# Patient Record
Sex: FEMALE | Race: WHITE | Employment: OTHER | URBAN - METROPOLITAN AREA
[De-identification: names, ages, dates, MRNs, and addresses within clinical notes are randomized per-mention and may not be internally consistent; named-entity substitution may affect disease eponyms.]

---

## 2017-01-04 DIAGNOSIS — E87.6 HYPOKALEMIA: Primary | ICD-10-CM

## 2017-01-04 RX ORDER — POTASSIUM CHLORIDE 1500 MG/1
TABLET, EXTENDED RELEASE ORAL
Qty: 120 TABLET | Refills: 7 | Status: SHIPPED | OUTPATIENT
Start: 2017-01-04 | End: 2017-08-29

## 2017-01-04 NOTE — TELEPHONE ENCOUNTER
Potassium 20meq tabs      Last Written Prescription Date: 09/10/16  Last Fill Quantity: 120, # refills: 3  Last Office Visit with [facility], [facility] or [facility] prescribing provider: 12/07/16   Next 5 appointments (look out 90 days)     Mar 08, 2017  9:00 AM   SHORT with Mari Dunaway MD   [facility] ([facility])    [address] [place] [id number] [phone]                   POTASSIUM   Date Value Ref Range Status   09/14/2016 4.9 3.4 - 5.3 mmol/L Final     CREATININE   Date Value Ref Range Status   09/16/2016 0.78 0.52 - 1.04 mg/dL Final     BP Readings from Last 3 Encounters:   12/15/16 138/72   12/07/16 112/68   10/10/16 120/70

## 2017-01-17 DIAGNOSIS — I10 ESSENTIAL HYPERTENSION, BENIGN: Primary | ICD-10-CM

## 2017-01-18 RX ORDER — CARVEDILOL 25 MG/1
25 TABLET ORAL 2 TIMES DAILY WITH MEALS
Qty: 180 TABLET | Refills: 2 | Status: SHIPPED | OUTPATIENT
Start: 2017-01-18 | End: 2017-09-25

## 2017-02-24 DIAGNOSIS — M79.603 PAIN OF UPPER EXTREMITY, UNSPECIFIED LATERALITY: ICD-10-CM

## 2017-02-24 DIAGNOSIS — G89.4 CHRONIC PAIN SYNDROME: ICD-10-CM

## 2017-02-24 NOTE — TELEPHONE ENCOUNTER
RX monitoring program ([place]MP) reviewed:  reviewed- no concerns    [place]MP profile:  [email or web address]

## 2017-02-24 NOTE — TELEPHONE ENCOUNTER
Controlled Substance Refill Request for Tramadol 50 mg tab   Problem List Complete:  No     PROVIDER TO CONSIDER COMPLETION OF PROBLEM LIST AND OVERVIEW/CONTROLLED SUBSTANCE AGREEMENT    Last Written Prescription Date:  12/21/16  Last Fill Quantity: 30,   # refills: 0    Last Office Visit with [facility] primary care provider: 12/07/16 Dr. Dunaway    Future Office visit:   Next 5 appointments (look out 90 days)     Mar 08, 2017  9:00 AM CST   SHORT with Mari Dunaway MD   [facility] ([facility]    [address] [place] [address] [phone]                  Controlled substance agreement on file: No.     Processing:  [facility]   checked in past 6 months?  No, route to RN

## 2017-02-27 RX ORDER — TRAMADOL HYDROCHLORIDE 50 MG/1
50 TABLET ORAL EVERY 6 HOURS PRN
Qty: 30 TABLET | Refills: 0 | Status: SHIPPED | OUTPATIENT
Start: 2017-02-27 | End: 2017-03-15

## 2017-02-27 NOTE — TELEPHONE ENCOUNTER
Faxed Tramadol Rx to [facility] [address] [place] [address]. [phone]. Ruth Behrens.

## 2017-03-07 ENCOUNTER — CARE COORDINATION (OUTPATIENT)
Dept: GERIATRIC MEDICINE | Facility: CLINIC | Age: 82
End: 2017-03-07

## 2017-03-07 NOTE — PROGRESS NOTES
Rec'd vm from Ana at [facility], request a call back to review current auth/rate that is in the system. [phone]  3/7/17 Call placed to Ana, current auth is 24 units per day, 3 days/wk.  Family is transporting client. Ana states that they have been denied by [employer] and inquired on unit rate vs daily rate. Shared that the Health Plans follow DHS guidelines and we are no longer able to utilize unit rate, only daily which is 6 hrs/day.  Enc'd Ana to f/u with [employer] on why the denial, if CM can assist to call back.  Anna Ely RN, BC  Supervisor [facility]   [phone] (Fax)

## 2017-03-10 NOTE — PROGRESS NOTES
3/9/17 Rec'd vm from Nikia, client's niece requesting a return call. Nikia states that Nataliia can only attend 6 hrs/day at [facility] and also inquired on [employer]   Call placed to Nikia to share that an auth for [facility] can be 6 hrs/day.  CM provided contact number to [employer] to f/u on application. Nikia states that she went back to work and the 6 hrs/day may be an issue as they are currently transporting her.   3/9/17 Noted that CM made error in [facility] auth-auth can be completed in units. $3.37/unit. Spoke with Ana, client attending 7.5 hrs per day 3 days/wk  (30 units per day)  Spoke with Ana, CM will make adjustment in auth.  3/9/17 Left vm with Nikia requesting a return call to report that Nataliia can cont to attend 7.5 hrs/day.  Anna Ely RN, BC  Supervisor [facility]   [phone] (Fax)

## 2017-03-15 ENCOUNTER — OFFICE VISIT (OUTPATIENT)
Dept: FAMILY MEDICINE | Facility: CLINIC | Age: 82
End: 2017-03-15
Payer: COMMERCIAL

## 2017-03-15 VITALS
OXYGEN SATURATION: 98 % | HEART RATE: 82 BPM | HEIGHT: 60 IN | SYSTOLIC BLOOD PRESSURE: 118 MMHG | BODY MASS INDEX: 37.03 KG/M2 | RESPIRATION RATE: 20 BRPM | DIASTOLIC BLOOD PRESSURE: 70 MMHG | TEMPERATURE: 98 F | WEIGHT: 188.6 LBS

## 2017-03-15 DIAGNOSIS — I10 HTN, GOAL BELOW 140/90: Primary | ICD-10-CM

## 2017-03-15 DIAGNOSIS — G89.4 CHRONIC PAIN SYNDROME: ICD-10-CM

## 2017-03-15 DIAGNOSIS — E78.5 HYPERLIPIDEMIA LDL GOAL <100: ICD-10-CM

## 2017-03-15 DIAGNOSIS — I48.20 CHRONIC ATRIAL FIBRILLATION (H): ICD-10-CM

## 2017-03-15 DIAGNOSIS — J44.9 CHRONIC OBSTRUCTIVE PULMONARY DISEASE, UNSPECIFIED COPD TYPE (H): ICD-10-CM

## 2017-03-15 DIAGNOSIS — M79.603 PAIN OF UPPER EXTREMITY, UNSPECIFIED LATERALITY: ICD-10-CM

## 2017-03-15 DIAGNOSIS — E11.59 TYPE 2 DIABETES MELLITUS WITH OTHER CIRCULATORY COMPLICATIONS (H): ICD-10-CM

## 2017-03-15 LAB — HBA1C MFR BLD: 5.8 % (ref 4.3–6)

## 2017-03-15 PROCEDURE — 99000 SPECIMEN HANDLING OFFICE-LAB: CPT | Performed by: FAMILY MEDICINE

## 2017-03-15 PROCEDURE — 83036 HEMOGLOBIN GLYCOSYLATED A1C: CPT | Performed by: FAMILY MEDICINE

## 2017-03-15 PROCEDURE — 80048 BASIC METABOLIC PNL TOTAL CA: CPT | Performed by: FAMILY MEDICINE

## 2017-03-15 PROCEDURE — 80307 DRUG TEST PRSMV CHEM ANLYZR: CPT | Mod: 90 | Performed by: FAMILY MEDICINE

## 2017-03-15 PROCEDURE — 99214 OFFICE O/P EST MOD 30 MIN: CPT | Performed by: FAMILY MEDICINE

## 2017-03-15 PROCEDURE — 36415 COLL VENOUS BLD VENIPUNCTURE: CPT | Performed by: FAMILY MEDICINE

## 2017-03-15 RX ORDER — TRAMADOL HYDROCHLORIDE 50 MG/1
50 TABLET ORAL EVERY 6 HOURS PRN
Qty: 60 TABLET | Refills: 0
Start: 2017-03-15 | End: 2017-04-04

## 2017-03-15 NOTE — NURSING NOTE
Chief Complaint   Patient presents with     Diabetes     follow-up       Initial /70 (BP Location: Right arm, Patient Position: Chair, Cuff Size: Adult Large)  Pulse 82  Temp 98  F (36.7  C) (Oral)  Resp 20  Ht 5' (1.524 m)  Wt 188 lb 9.6 oz (85.5 kg)  SpO2 98%  BMI 36.83 kg/m2 Estimated body mass index is 36.83 kg/(m^2) as calculated from the following:    Height as of this encounter: 5' (1.524 m).    Weight as of this encounter: 188 lb 9.6 oz (85.5 kg).  Medication Reconciliation: complete     Ruchi Aly, CMA

## 2017-03-15 NOTE — PROGRESS NOTES
SUBJECTIVE:                                                    Nataliia Scanlon is a 81 year old female who presents to clinic today for the following health issues:        Diabetes Follow-up      Patient is checking blood sugars: not at all    Diabetic concerns: None     Symptoms of hypoglycemia (low blood sugar): none     Paresthesias (numbness or burning in feet) or sores: No     Date of last diabetic eye exam: June 2016     Hyperlipidemia Follow-Up      Rate your low fat/cholesterol diet?: good    Taking statin?  Yes, no muscle aches from statin    Other lipid medications/supplements?:  Fish oil/Omega 3, dose  without side effects     Hypertension Follow-up      Outpatient blood pressures are being checked at home.  Results are WNL.    Low Salt Diet: no added salt         Amount of exercise or physical activity: 2 days per week    Problems taking medications regularly: No    Medication side effects: none  Diet: regular (no restrictions)    Past Medical History   Diagnosis Date     Aortic sclerosis (H) 2006     check echo every 2 years - last one 11/2013     Atrial fibrillation (H) 2014     Dr. Lamar and she decided NO coumadin 1/2015     Baker's cyst of knee 4/2008     left - small     CHF (congestive heart failure) 12/3/2014     Chronic airway obstruction, not elsewhere classified 2002     moderately severe     Diabetes type 2, controlled (H) 2011     HTN, goal below 140/90 5/21/2003     Hyperlipidaemia      Maxillary fracture (H) 5-14     Mitral regurgitation      1-2+     NONSPECIFIC MEDICAL HISTORY 2006     pt is DNI but not DNR - see living will      Osteoarthritis 4/2009     and bilateral knee steroid injection     Syncope      Tricuspid regurgitation      2-3+     Unspecified essential hypertension      Venous insufficiency 5/19/2010       Past Surgical History   Procedure Laterality Date     C nonspecific procedure       hosp x 1 for HTN       MEDICATIONS:  Current Outpatient Prescriptions   Medication      traMADol (ULTRAM) 50 MG tablet     carvedilol (COREG) 25 MG tablet     potassium chloride SA (K-DUR/KLOR-CON M) 20 MEQ CR tablet     spironolactone (ALDACTONE) 25 MG tablet     fluticasone (FLONASE) 50 MCG/ACT spray     aspirin 81 MG tablet     donepezil (ARICEPT) 5 MG tablet     losartan (COZAAR) 100 MG tablet     fish oil-omega-3 fatty acids 1000 MG capsule     Magnesium 400 MG CAPS     order for DME     order for DME     hydrALAZINE (APRESOLINE) 25 MG tablet     atorvastatin (LIPITOR) 20 MG tablet     fluticasone-salmeterol (ADVAIR DISKUS) 250-50 MCG/DOSE diskus inhaler     furosemide (LASIX) 20 MG tablet     calcium carbonate-vitamin D 500-400 MG-UNIT TABS tablt     Elastic Bandages & Supports (JOBST RELIEF KNEE HIGH/MEDIUM) MISC     albuterol (2.5 MG/3ML) 0.083% nebulizer solution     albuterol (PROAIR HFA, PROVENTIL HFA, VENTOLIN HFA) 108 (90 BASE) MCG/ACT inhaler     order for DME     ascorbic acid (VITAMIN C) 500 MG tablet     ipratropium - albuterol 0.5 mg/2.5 mg/3 mL (DUONEB) 0.5-2.5 (3) MG/3ML nebulization     No current facility-administered medications for this visit.        SOCIAL HISTORY:  Social History   Substance Use Topics     Smoking status: Never Smoker     Smokeless tobacco: Never Used     Alcohol use No       Family History   Problem Relation Age of Onset     Hypertension Mother      Hypertension Sister        Objective:  Blood pressure 118/70, pulse 82, temperature 98  F (36.7  C), temperature source Oral, resp. rate 20, height 5' (1.524 m), weight 188 lb 9.6 oz (85.5 kg), SpO2 98 %, not currently breastfeeding.  Neck:  There is no lymphadenopathy or thyroid tenderness or enlargement  Chest: Clear to auscultation bilaterally.  No wheezes, rales or retractions.  CV:  irreg irreg with NORMAL rate and with 2-3 /6 murmur - which is stable  Extremities: There is no clubbing, cyanosis - some puffy NON pitting 1+ ankle edema.   Peripheral pulses are present bilaterally in the radial and dorsalis  pedis arteries.    Assessment:  1. hypertension - under good control  2. Diabetes - under good control  3. Lipids - have been good  4. Aortic sclerosis - stable  5. COPD -stable -very good this last season  6. Chronic a fib - stable and no CHF in last year  7. Chronic pain - will start giving 60 ultram at once which will last year 1-2 months per her request      Plan:  1. Continue meds at current doses  2. Does not need any refills today  3. See [facility] orders for labs  4. Recheck in 6 months  5. Will give 60 tramadol each refill now and will not need every 30 days anymore but more like every 45 days.

## 2017-03-15 NOTE — PATIENT INSTRUCTIONS
You may schedule your mammogram at [facility] by calling [phone] and asking to schedule your mammogram or you may schedule with [facility] [place] in [place] by calling [phone].

## 2017-03-15 NOTE — LETTER
[facility]  [address]  [place], [place], [id number] [phone]      March 20, 2017    Nataliia Scanlon  [address]   [facility] [address]          Dear Nataliia,    The results of your recent tests were normal.  Enclosed is a copy of the results.  It was a pleasure to see you at your last appointment.  Results for orders placed or performed in visit on 03/15/17   Hemoglobin A1c   Result Value Ref Range    Hemoglobin A1C 5.8 4.3 - 6.0 %   Basic metabolic panel   Result Value Ref Range    Sodium 138 133 - 144 mmol/L    Potassium 5.0 3.4 - 5.3 mmol/L    Chloride 100 94 - 109 mmol/L    Carbon Dioxide 33 (H) 20 - 32 mmol/L    Anion Gap 5 3 - 14 mmol/L    Glucose 91 70 - 99 mg/dL    Urea Nitrogen 18 7 - 30 mg/dL    Creatinine 0.78 0.52 - 1.04 mg/dL    GFR Estimate 71 >60 mL/min/1.7m2    GFR Estimate If Black 86 >60 mL/min/1.7m2    Calcium 8.6 8.5 - 10.1 mg/dL   Drug Screen Comprehensive , Urine with Reported Meds (Pain Care Package)   Result Value Ref Range    Pain Drug SCR UR W RPTD Meds       FINAL  (Note)  ====================================================================  TOXASSURE COMP DRUG ANALYSIS,UR  ====================================================================  Test                             Result       Flag       Units  Drug Present and Declared for Prescription Verification   Tramadol                       PRESENT      EXPECTED   O-Desmethyltramadol            PRESENT      EXPECTED   N-Desmethyltramadol            PRESENT      EXPECTED    Source of tramadol is a prescription medication.    O-desmethyltramadol and N-desmethyltramadol are expected    metabolites of tramadol.    ====================================================================  Test                      Result    Flag   Units      Ref Range   Creatinine              56               mg/dL      >=20  ====================================================================  Declared Medications:  The flagging  and interpretation on this report are based on the  following declared medications.   Unexpected results may arise from  inaccuracies in the declared medications.    **Note: The testing scope of this panel includes these medications:    Tramadol  Tramadol (Ultram)  ====================================================================  For clinical consultation, please call [phone].  ====================================================================  Analysis performed by [facility] [address], [place] [address]         If you have any questions or concerns, please call myself or my nurse at [phone].          Sincerely,    Mari Jay MD  [id number]

## 2017-03-15 NOTE — MR AVS SNAPSHOT
"              After Visit Summary   3/15/2017    Nataliia Scanlon    MRN: [id number]           Patient Information     Date Of Birth          1935        Visit Information        Provider Department      3/15/2017 9:15 AM Mari Dunaway MD [facility]        Today's Diagnoses     HTN, goal below 140/90    -  1    Chronic obstructive pulmonary disease, unspecified COPD type (H)        Hyperlipidemia LDL goal <100        Aortic sclerosis (H)        Type 2 diabetes mellitus with other circulatory complications (H)        Chronic atrial fibrillation (H)        Chronic pain syndrome        Pain of upper extremity, unspecified laterality           Follow-ups after your visit        Your next 10 appointments already scheduled     Jun 12, 2017  9:30 AM CDT   SHORT with Timoteo Ramos RPH   [facility] ([facility])    [address] [facility] [address] [phone]              Who to contact     If you have questions or need follow up information about today's clinic visit or your schedule please contact [facility] directly at [phone].  Normal or non-critical lab and imaging results will be communicated to you by Bawtehart, letter or phone within 4 business days after the clinic has received the results. If you do not hear from us within 7 days, please contact the clinic through Bawtehart or phone. If you have a critical or abnormal lab result, we will notify you by phone as soon as possible.  Submit refill requests through Trunity or call your pharmacy and they will forward the refill request to us. Please allow 3 business days for your refill to be completed.          Additional Information About Your Visit        MyChart Information     Trunity lets you send messages to your doctor, view your test results, renew your prescriptions, schedule appointments and more. To sign up, go to [email or web address] . Click on \"Log in\" " "on the left side of the screen, which will take you to the Welcome page. Then click on \"Sign up Now\" on the right side of the page.     You will be asked to enter the access code listed below, as well as some personal information. Please follow the directions to create your username and password.     Your access code is: [email or web address]  Expires: 2017  9:44 AM     Your access code will  in 90 days. If you need help or a new code, please call your [facility] or [phone].        Care EveryWhere ID     This is your Care EveryWhere ID. This could be used by other organizations to access your [place] medical records  [id number]        Your Vitals Were     Pulse Temperature Respirations Height Pulse Oximetry BMI (Body Mass Index)    82 98  F (36.7  C) (Oral) 20 5' (1.524 m) 98% 36.83 kg/m2       Blood Pressure from Last 3 Encounters:   03/15/17 118/70   12/15/16 138/72   16 112/68    Weight from Last 3 Encounters:   03/15/17 188 lb 9.6 oz (85.5 kg)   12/15/16 190 lb 12.8 oz (86.5 kg)   16 188 lb 14.4 oz (85.7 kg)              We Performed the Following     Basic metabolic panel     Drug Screen Comprehensive , Urine with Reported Meds (Pain Care Package)     Hemoglobin A1c          Today's Medication Changes          These changes are accurate as of: 3/15/17  9:47 AM.  If you have any questions, ask your nurse or doctor.               These medicines have changed or have updated prescriptions.        Dose/Directions    calcium carbonate-vitamin D 500-400 MG-UNIT Tabs per tablet   This may have changed:  when to take this   Used for:  Osteopenia        Dose:  1 tablet   Take 1 tablet by mouth 3 times daily   Quantity:  270 tablet   Refills:  3       fluticasone-salmeterol 250-50 MCG/DOSE diskus inhaler   Commonly known as:  ADVAIR DISKUS   This may have changed:  additional instructions   Used for:  Chronic obstructive pulmonary disease, unspecified COPD type (H)        Dose:  1 puff   Inhale " 1 puff into the lungs 2 times daily   Quantity:  1 Inhaler   Refills:  3            Where to get your medicines      Some of these will need a paper prescription and others can be bought over the counter.  Ask your nurse if you have questions.     You don't need a prescription for these medications     traMADol 50 MG tablet                Primary Care Provider Office Phone # Fax #    Mari Dunaway [phone]       [facility] [address] [facility] [address]        Thank you!     Thank you for choosing [facility]  for your care. Our goal is always to provide you with excellent care. Hearing back from our patients is one way we can continue to improve our services. Please take a few minutes to complete the written survey that you may receive in the mail after your visit with us. Thank you!             Your Updated Medication List - Protect others around you: Learn how to safely use, store and throw away your medicines at www.disposemymeds.org.          This list is accurate as of: 3/15/17  9:47 AM.  Always use your most recent med list.                   Brand Name Dispense Instructions for use    * albuterol 108 (90 BASE) MCG/ACT Inhaler    PROAIR HFA/PROVENTIL HFA/VENTOLIN HFA    1 Inhaler    Inhale 2 puffs into the lungs every 6 hours as needed for shortness of breath / dyspnea or wheezing       * albuterol (2.5 MG/3ML) 0.083% neb solution     30 vial    Take 1 vial (2.5 mg) by nebulization every 6 hours as needed for shortness of breath / dyspnea or wheezing       ascorbic acid 500 MG tablet    VITAMIN C     Take 500 mg by mouth daily       aspirin 81 MG tablet     100 tablet    Take 1 tablet (81 mg) by mouth daily       atorvastatin 20 MG tablet    LIPITOR    30 tablet    Take 1 tablet (20 mg) by mouth daily       calcium carbonate-vitamin D 500-400 MG-UNIT Tabs per tablet     270 tablet    Take 1 tablet by mouth 3 times daily       carvedilol 25 MG tablet     COREG    180 tablet    Take 1 tablet (25 mg) by mouth 2 times daily (with meals)       donepezil 5 MG tablet    ARIcept     Take 1 tablet by mouth daily       fish oil-omega-3 fatty acids 1000 MG capsule      Take 1 g by mouth daily       fluticasone 50 MCG/ACT spray    FLONASE    1 Bottle    Spray 1-2 sprays into both nostrils daily       fluticasone-salmeterol 250-50 MCG/DOSE diskus inhaler    ADVAIR DISKUS    1 Inhaler    Inhale 1 puff into the lungs 2 times daily       furosemide 20 MG tablet    LASIX    60 tablet    Take 1 tablet (20 mg) by mouth 2 times daily       hydrALAZINE 25 MG tablet    APRESOLINE    180 tablet    Take 2 tablets (50 mg) by mouth daily       ipratropium - albuterol 0.5 mg/2.5 mg/3 mL 0.5-2.5 (3) MG/3ML neb solution    DUONEB    3 mL    Take 1 vial (3 mLs) by nebulization once for 1 dose       JOBST RELIEF KNEE HIGH/MEDIUM Misc     1 each    1 each daily       losartan 100 MG tablet    COZAAR    30 tablet    Take 1 tablet (100 mg) by mouth daily       Magnesium 400 MG Caps      Take 1 tablet by mouth daily       * order for DME     1 Units    Equipment being ordered: Nebulizer       * order for DME     1 each    Equipment being ordered: diabetic shoe inserts       * order for DME     1 each    Equipment being ordered: diabetic shoes       potassium chloride SA 20 MEQ CR tablet    K-DUR/KLOR-CON M    120 tablet    Take 2 tablets twice daily       spironolactone 25 MG tablet    ALDACTONE    90 tablet    Take 1 tablet (25 mg) by mouth daily       traMADol 50 MG tablet    ULTRAM    60 tablet    Take 1 tablet (50 mg) by mouth every 6 hours as needed for moderate pain       * Notice:  This list has 5 medication(s) that are the same as other medications prescribed for you. Read the directions carefully, and ask your doctor or other care provider to review them with you.

## 2017-03-16 LAB
ANION GAP SERPL CALCULATED.3IONS-SCNC: 5 MMOL/L (ref 3–14)
BUN SERPL-MCNC: 18 MG/DL (ref 7–30)
CALCIUM SERPL-MCNC: 8.6 MG/DL (ref 8.5–10.1)
CHLORIDE SERPL-SCNC: 100 MMOL/L (ref 94–109)
CO2 SERPL-SCNC: 33 MMOL/L (ref 20–32)
CREAT SERPL-MCNC: 0.78 MG/DL (ref 0.52–1.04)
GFR SERPL CREATININE-BSD FRML MDRD: 71 ML/MIN/1.7M2
GLUCOSE SERPL-MCNC: 91 MG/DL (ref 70–99)
POTASSIUM SERPL-SCNC: 5 MMOL/L (ref 3.4–5.3)
SODIUM SERPL-SCNC: 138 MMOL/L (ref 133–144)

## 2017-03-18 LAB — PAIN DRUG SCR UR W RPTD MEDS: NORMAL

## 2017-03-20 ENCOUNTER — TELEPHONE (OUTPATIENT)
Dept: FAMILY MEDICINE | Facility: CLINIC | Age: 82
End: 2017-03-20

## 2017-03-20 NOTE — TELEPHONE ENCOUNTER
Failing due to CSA not being on file but it is - it was done on 9/20/17   Who knows how we get her to pass?

## 2017-03-21 ENCOUNTER — DOCUMENTATION ONLY (OUTPATIENT)
Dept: OTHER | Facility: CLINIC | Age: 82
End: 2017-03-21

## 2017-03-21 DIAGNOSIS — Z71.89 ACP (ADVANCE CARE PLANNING): Chronic | ICD-10-CM

## 2017-03-21 DIAGNOSIS — E87.6 HYPOKALEMIA: ICD-10-CM

## 2017-03-21 NOTE — TELEPHONE ENCOUNTER
Spironolactone 25 mg tab       Last Written Prescription Date: 01/02/17  Last Fill Quantity: 90, # refills: 0  Last Office Visit with G, P or [facility] prescribing provider: 03/15/17 Dr. Dunaway   Next 5 appointments (look out 90 days)     Jun 12, 2017  9:30 AM CDT   SHORT with Timoteo Ramos RPH   [facility] ([facility])    [id number] [phone]                   Potassium   Date Value Ref Range Status   03/15/2017 5.0 3.4 - 5.3 mmol/L Final     Creatinine   Date Value Ref Range Status   03/15/2017 0.78 0.52 - 1.04 mg/dL Final     BP Readings from Last 3 Encounters:   03/15/17 118/70   12/15/16 138/72   12/07/16 112/68

## 2017-03-22 RX ORDER — SPIRONOLACTONE 25 MG/1
TABLET ORAL
Qty: 90 TABLET | Refills: 1 | Status: SHIPPED | OUTPATIENT
Start: 2017-03-22 | End: 2017-09-12

## 2017-03-27 NOTE — TELEPHONE ENCOUNTER
After speaking with Kylie on the quality team, she stated that this has been a glitch in the system and it should be fixed.   Ruchi Aly CMA

## 2017-03-30 DIAGNOSIS — I50.9 CHF (CONGESTIVE HEART FAILURE) (H): ICD-10-CM

## 2017-03-30 DIAGNOSIS — J44.9 CHRONIC OBSTRUCTIVE PULMONARY DISEASE, UNSPECIFIED COPD TYPE (H): ICD-10-CM

## 2017-03-30 NOTE — TELEPHONE ENCOUNTER
ADVAIR DISKUS 250-50 MCG/DOSE diskus inhaler        Last Written Prescription Date: 8/9/16  Last Fill Quantity: 1, # refills: 3    Last Office Visit with [facility] [place], [facility] or [facility] prescribing provider:  3/15/2017     Future Office Visit:    Next 5 appointments (look out 90 days)     Jun 12, 2017  9:30 AM CDT   SHORT with Timoteo Ramos RPH   [facility])    [id number] [place] [address] [phone]                   Date of Last Asthma Action Plan Letter:   There are no preventive care reminders to display for this patient.   Asthma Control Test: No flowsheet data found.    Date of Last Spirometry Test:   3/3/14    ________________________________________________________________________________  furosemide (LASIX) 20 MG tablet      Last Written Prescription Date: 5/24/16  Last Fill Quantity: 60, # refills: 5  Last Office Visit with [facility] [place], [facility] or  Health prescribing provider: 3/15/2017      Potassium   Date Value Ref Range Status   03/15/2017 5.0 3.4 - 5.3 mmol/L Final     Creatinine   Date Value Ref Range Status   03/15/2017 0.78 0.52 - 1.04 mg/dL Final     BP Readings from Last 3 Encounters:   03/15/17 118/70   12/15/16 138/72   12/07/16 112/68         KATELYN Laguna  March 30, 2017  11:00 AM

## 2017-03-31 RX ORDER — FUROSEMIDE 20 MG
TABLET ORAL
Qty: 60 TABLET | Refills: 5 | Status: SHIPPED | OUTPATIENT
Start: 2017-03-31 | End: 2017-09-25

## 2017-04-04 DIAGNOSIS — M79.603 PAIN OF UPPER EXTREMITY, UNSPECIFIED LATERALITY: ICD-10-CM

## 2017-04-05 RX ORDER — TRAMADOL HYDROCHLORIDE 50 MG/1
TABLET ORAL
Qty: 60 TABLET | Refills: 0 | Status: SHIPPED | OUTPATIENT
Start: 2017-04-05 | End: 2017-05-30

## 2017-04-07 ENCOUNTER — CARE COORDINATION (OUTPATIENT)
Dept: GERIATRIC MEDICINE | Facility: CLINIC | Age: 82
End: 2017-04-07

## 2017-04-07 NOTE — PROGRESS NOTES
4/6/17 Rec'd tele call from client's niece Nikia to report that [employer] has been approved. Nikia states that she was informed $4 per each way and was instructed to get a Go To Card. Nikia inquired if this is covered under EW. Explained that it is, CM will f/u and update her.  4/7/17 Rec'd info that [employer] can provide a Go To Card, CM to auth $104 per month.  Anna Ely RN, BC  Supervisor [facility]   [phone] (Fax)

## 2017-04-11 ENCOUNTER — CARE COORDINATION (OUTPATIENT)
Dept: GERIATRIC MEDICINE | Facility: CLINIC | Age: 82
End: 2017-04-11

## 2017-04-11 NOTE — PROGRESS NOTES
[facility]    6 month telephone assessment completed on 4/11/2017  EPIC notes reviewed. Call placed to client who states that she is doing well, is enjoying the [facility].   Shared he upcoming MTM appt.     ER visits: No  Hospitalizations: No  TCU stays: No  Significant health status changes: none reported, states that she is compliant with medication management   Falls/Injuries: No  ADL/IADL changes: No  Changes in services: No    Caregiver Assessment follow up:  N/a     Goals: See POC in chart for goal progress documentation.    MTM appt scheduled in June, client reports no falls, client is attending [facility] 3 days/wk and states that she is enjoying the time there.     Will see client in 6 months for an annual health risk assessment.   Encouraged client to call CM with any questions or concerns in the meantime.   Anna Ely RN, BC  Supervisor [facility]   [phone] (Fax)

## 2017-04-12 NOTE — PROGRESS NOTES
Per [employer]:  The member authorization has been approved and is good through the end of  03/31/2018.  Member has been added on the reoccurring list.   A new Go To card #[id number] is sent on 4/12/17 to the member address : [facility] [address], [facility] [address].  Also advise the member to keep the card since it will be reloaded for future appointment needs.    Thank you,   Rosalinda Chapman  CMS Supervisor  [facility]  [phone]

## 2017-04-12 NOTE — PROGRESS NOTES
Left vm with client's niece Nikia Esposito with information below re: Go To Card. Request a call back as needed.  Anna Ely RN, BC  Supervisor [facility]   [phone] (Fax)

## 2017-05-06 ENCOUNTER — OFFICE VISIT (OUTPATIENT)
Dept: URGENT CARE | Facility: URGENT CARE | Age: 82
End: 2017-05-06
Payer: COMMERCIAL

## 2017-05-06 VITALS
DIASTOLIC BLOOD PRESSURE: 63 MMHG | SYSTOLIC BLOOD PRESSURE: 98 MMHG | OXYGEN SATURATION: 95 % | HEART RATE: 84 BPM | TEMPERATURE: 98.2 F

## 2017-05-06 DIAGNOSIS — M72.2 PLANTAR FASCIITIS: Primary | ICD-10-CM

## 2017-05-06 DIAGNOSIS — M77.41 METATARSALGIA, RIGHT FOOT: ICD-10-CM

## 2017-05-06 DIAGNOSIS — M67.88 ACHILLES TENDINOSIS OF LEFT LOWER EXTREMITY: ICD-10-CM

## 2017-05-06 PROCEDURE — 99213 OFFICE O/P EST LOW 20 MIN: CPT | Performed by: FAMILY MEDICINE

## 2017-05-06 NOTE — NURSING NOTE
Chief Complaint   Patient presents with     Urgent Care     Musculoskeletal Problem     Bilateral leg and foot pain- walking makes it worst       Initial BP 98/63 (BP Location: Right arm, Patient Position: Chair, Cuff Size: Adult Large)  Pulse 84  Temp 98.2  F (36.8  C) (Oral)  SpO2 95% Estimated body mass index is 36.83 kg/(m^2) as calculated from the following:    Height as of 3/15/17: 5' (1.524 m).    Weight as of 3/15/17: 188 lb 9.6 oz (85.5 kg).  Medication Reconciliation: complete     Autumn Kaufman CMA (AAMA)

## 2017-05-06 NOTE — PROGRESS NOTES
SUBJECTIVE  Chief Complaint   Patient presents with     Urgent Care     Musculoskeletal Problem     Bilateral leg and foot pain- walking makes it worst     Nataliia Scanlon is a 81 year old female  who has had a bilateral foot pain  that started  6 months ago.  No known injury . Symptoms have been gradual, intermittent, waxing and waning  . Prior history of related problems: no prior problems with this area in the past.      Pain is worse with standing, better with resting.   She wears tennis shoes with insoles at home    Past Medical History:   Diagnosis Date     Aortic sclerosis (H) 2006    check echo every 2 years - last one 11/2013     Atrial fibrillation (H) 2014    Dr. Lamar and she decided NO coumadin 1/2015     Baker's cyst of knee 4/2008    left - small     CHF (congestive heart failure) 12/3/2014     Chronic airway obstruction, not elsewhere classified 2002    moderately severe     Diabetes type 2, controlled (H) 2011     HTN, goal below 140/90 5/21/2003     Hyperlipidaemia      Maxillary fracture (H) 5-14     Mitral regurgitation     1-2+     NONSPECIFIC MEDICAL HISTORY 2006    pt is DNI but not DNR - see living will      Osteoarthritis 4/2009    and bilateral knee steroid injection     Syncope      Tricuspid regurgitation     2-3+     Unspecified essential hypertension      Venous insufficiency 5/19/2010       ALLERGIES:  No known drug allergies      Current Outpatient Prescriptions on File Prior to Visit:  traMADol (ULTRAM) 50 MG tablet TAKE 1 TABLET BY MOUTH EVERY 6 HOURS AS NEEDED FOR MODERATE PAIN   ADVAIR DISKUS 250-50 MCG/DOSE diskus inhaler INHALE 1 PUFF INTO THE LUNGS TWICE DAILY   furosemide (LASIX) 20 MG tablet TAKE 1 TABLET(20 MG) BY MOUTH TWICE DAILY   spironolactone (ALDACTONE) 25 MG tablet TAKE ONE TABLET BY MOUTH EVERY DAY   carvedilol (COREG) 25 MG tablet Take 1 tablet (25 mg) by mouth 2 times daily (with meals)   potassium chloride SA (K-DUR/KLOR-CON M) 20 MEQ CR tablet Take 2 tablets twice  daily   fluticasone (FLONASE) 50 MCG/ACT spray Spray 1-2 sprays into both nostrils daily   aspirin 81 MG tablet Take 1 tablet (81 mg) by mouth daily   donepezil (ARICEPT) 5 MG tablet Take 1 tablet by mouth daily   losartan (COZAAR) 100 MG tablet Take 1 tablet (100 mg) by mouth daily   fish oil-omega-3 fatty acids 1000 MG capsule Take 1 g by mouth daily   Magnesium 400 MG CAPS Take 1 tablet by mouth daily   order for DME Equipment being ordered: diabetic shoe inserts   order for DME Equipment being ordered: diabetic shoes   hydrALAZINE (APRESOLINE) 25 MG tablet Take 2 tablets (50 mg) by mouth daily   atorvastatin (LIPITOR) 20 MG tablet Take 1 tablet (20 mg) by mouth daily   calcium carbonate-vitamin D 500-400 MG-UNIT TABS tablt Take 1 tablet by mouth 3 times daily (Patient taking differently: Take 1 tablet by mouth 2 times daily )   Elastic Bandages & Supports (JOBST RELIEF KNEE HIGH/MEDIUM) MISC 1 each daily   albuterol (2.5 MG/3ML) 0.083% nebulizer solution Take 1 vial (2.5 mg) by nebulization every 6 hours as needed for shortness of breath / dyspnea or wheezing   albuterol (PROAIR HFA, PROVENTIL HFA, VENTOLIN HFA) 108 (90 BASE) MCG/ACT inhaler Inhale 2 puffs into the lungs every 6 hours as needed for shortness of breath / dyspnea or wheezing   order for DME Equipment being ordered: Nebulizer   ascorbic acid (VITAMIN C) 500 MG tablet Take 500 mg by mouth daily   ipratropium - albuterol 0.5 mg/2.5 mg/3 mL (DUONEB) 0.5-2.5 (3) MG/3ML nebulization Take 1 vial (3 mLs) by nebulization once for 1 dose     No current facility-administered medications on file prior to visit.     Social History   Substance Use Topics     Smoking status: Never Smoker     Smokeless tobacco: Never Used     Alcohol use No       Family History   Problem Relation Age of Onset     Hypertension Mother      Hypertension Sister          ROS:  INTEGUMENTARY/SKIN: NEGATIVE for worrisome rashes, moles or lesions  ENT/MOUTH: NEGATIVE for ear, mouth and  throat problems  RESP:NEGATIVE for significant cough or SOB  GI: NEGATIVE for nausea, abdominal pain, heartburn, or change in bowel habits    OBJECTIVE:  BP 98/63 (BP Location: Right arm, Patient Position: Chair, Cuff Size: Adult Large)  Pulse 84  Temp 98.2  F (36.8  C) (Oral)  SpO2 95%  Appearance: alert and cooperative.  Mild distress    right FOOT  No swelling, contusion or pain with ROM of the ankle  No pain with ROM of toes  Sensation and movement  intact all toes,    Tenderness to palpation  Metatarsal  region of the foot region of 2nd and 3rd metatarsal  no  swelling dorsum of foot  No contusion of foot         left FOOT  No swelling, contusion or pain with ROM of the ankle  No pain with ROM of toes  Sensation and movement  intact all toes,    Tenderness to palpation  Plantar fascia of heal and achilles attachment to the heel region of the foot  no  swelling dorsum of foot  No contusion of foot        ASSESSMENT:  Plantar fasciitis  Discussed stretching exercises,  Extra padding for shoes  Soaks with epsom salts    Metatarsalgia, right foot  Discussed shoe padding,  Possible steroid injection    Achilles tendinosis of left lower extremity  Discussed stretching exercises      warm packs to encourage blood flow to the painful region  Use cane  to limit weight bearing if pain is causing problems with ambulating  Has tramadol   for pain  Patient was given  recommendations about appropriate footwear to improve pain symptoms  Follow-up with   podiatry     For ongoing management    Note for work

## 2017-05-06 NOTE — MR AVS SNAPSHOT
After Visit Summary   5/6/2017    Nataliia Scanlon    MRN: [id number]           Patient Information     Date Of Birth          1935        Visit Information        Provider Department      5/6/2017 3:00 PM Nohemy Villegas MD [facility] URGENT [facility]        Today's Diagnoses     Plantar fasciitis    -  1    Metatarsalgia, right foot        Achilles tendinosis of left lower extremity           Follow-ups after your visit        Your next 10 appointments already scheduled     May 09, 2017  2:15 PM CDT   New Visit with Jaelyn Johnson DPM, Podiatry/Foot and Ankle Surgery   [facility]    [address] [place] [id number]   [phone]            Jun 12, 2017  9:30 AM CDT   [facility]    [address] [place] [address]   [phone]            Sep 18, 2017 10:30 AM CDT   SHORT with Mari Dunaway MD   [facility]    [address] [place] [id number]   [phone]              Who to contact     If you have questions or need follow up information about today's clinic visit or your schedule please contact [facility] directly at [phone].  Normal or non-critical lab and imaging results will be communicated to you by MyChart, letter or phone within 4 business days after the clinic has received the results. If you do not hear from us within 7 days, please contact the clinic through MyChart or phone. If you have a critical or abnormal lab result, we will notify you by phone as soon as possible.  Submit refill requests through [employer] or call your pharmacy and they will forward the refill request to us. Please allow 3 business days for your refill to be completed.          Additional Information About Your Visit        MyChart Information      "[employer] lets you send messages to your doctor, view your test results, renew your prescriptions, schedule appointments and more. To sign up, go to [email or web address] . Click on \"Log in\" on the left side of the screen, which will take you to the Welcome page. Then click on \"Sign up Now\" on the right side of the page.     You will be asked to enter the access code listed below, as well as some personal information. Please follow the directions to create your username and password.     Your access code is: [email or web address]  Expires: 2017  9:44 AM     Your access code will  in 90 days. If you need help or a new code, please call your [facility] clinic or [phone].        Care EveryWhere ID     This is your Care EveryWhere ID. This could be used by other organizations to access your [facility] medical records  [id number]        Your Vitals Were     Pulse Temperature Pulse Oximetry             84 98.2  F (36.8  C) (Oral) 95%          Blood Pressure from Last 3 Encounters:   17 98/63   03/15/17 118/70   12/15/16 138/72    Weight from Last 3 Encounters:   03/15/17 188 lb 9.6 oz (85.5 kg)   12/15/16 190 lb 12.8 oz (86.5 kg)   16 188 lb 14.4 oz (85.7 kg)              Today, you had the following     No orders found for display         Today's Medication Changes          These changes are accurate as of: 17  3:59 PM.  If you have any questions, ask your nurse or doctor.               These medicines have changed or have updated prescriptions.        Dose/Directions    calcium carbonate-vitamin D 500-400 MG-UNIT Tabs per tablet   This may have changed:  when to take this   Used for:  Osteopenia        Dose:  1 tablet   Take 1 tablet by mouth 3 times daily   Quantity:  270 tablet   Refills:  3                Primary Care Provider Office Phone # Fax #    Mari Dunaway [phone]       [facility] [id number] [place] [address]        Thank you!     Thank you " for choosing [facility]  for your care. Our goal is always to provide you with excellent care. Hearing back from our patients is one way we can continue to improve our services. Please take a few minutes to complete the written survey that you may receive in the mail after your visit with us. Thank you!             Your Updated Medication List - Protect others around you: Learn how to safely use, store and throw away your medicines at www.disposemymeds.org.          This list is accurate as of: 5/6/17  3:59 PM.  Always use your most recent med list.                   Brand Name Dispense Instructions for use    ADVAIR DISKUS 250-50 MCG/DOSE diskus inhaler   Generic drug:  fluticasone-salmeterol     3 Inhaler    INHALE 1 PUFF INTO THE LUNGS TWICE DAILY       * albuterol 108 (90 BASE) MCG/ACT Inhaler    PROAIR HFA/PROVENTIL HFA/VENTOLIN HFA    1 Inhaler    Inhale 2 puffs into the lungs every 6 hours as needed for shortness of breath / dyspnea or wheezing       * albuterol (2.5 MG/3ML) 0.083% neb solution     30 vial    Take 1 vial (2.5 mg) by nebulization every 6 hours as needed for shortness of breath / dyspnea or wheezing       ascorbic acid 500 MG tablet    VITAMIN C     Take 500 mg by mouth daily       aspirin 81 MG tablet     100 tablet    Take 1 tablet (81 mg) by mouth daily       atorvastatin 20 MG tablet    LIPITOR    30 tablet    Take 1 tablet (20 mg) by mouth daily       calcium carbonate-vitamin D 500-400 MG-UNIT Tabs per tablet     270 tablet    Take 1 tablet by mouth 3 times daily       carvedilol 25 MG tablet    COREG    180 tablet    Take 1 tablet (25 mg) by mouth 2 times daily (with meals)       donepezil 5 MG tablet    ARIcept     Take 1 tablet by mouth daily       fish oil-omega-3 fatty acids 1000 MG capsule      Take 1 g by mouth daily       fluticasone 50 MCG/ACT spray    FLONASE    1 Bottle    Spray 1-2 sprays into both nostrils daily       furosemide 20 MG tablet    LASIX    60  tablet    TAKE 1 TABLET(20 MG) BY MOUTH TWICE DAILY       hydrALAZINE 25 MG tablet    APRESOLINE    180 tablet    Take 2 tablets (50 mg) by mouth daily       ipratropium - albuterol 0.5 mg/2.5 mg/3 mL 0.5-2.5 (3) MG/3ML neb solution    DUONEB    3 mL    Take 1 vial (3 mLs) by nebulization once for 1 dose       JOBST RELIEF KNEE HIGH/MEDIUM Misc     1 each    1 each daily       losartan 100 MG tablet    COZAAR    30 tablet    Take 1 tablet (100 mg) by mouth daily       Magnesium 400 MG Caps      Take 1 tablet by mouth daily       * order for DME     1 Units    Equipment being ordered: Nebulizer       * order for DME     1 each    Equipment being ordered: diabetic shoe inserts       * order for DME     1 each    Equipment being ordered: diabetic shoes       potassium chloride SA 20 MEQ CR tablet    K-DUR/KLOR-CON M    120 tablet    Take 2 tablets twice daily       spironolactone 25 MG tablet    ALDACTONE    90 tablet    TAKE ONE TABLET BY MOUTH EVERY DAY       traMADol 50 MG tablet    ULTRAM    60 tablet    TAKE 1 TABLET BY MOUTH EVERY 6 HOURS AS NEEDED FOR MODERATE PAIN       * Notice:  This list has 5 medication(s) that are the same as other medications prescribed for you. Read the directions carefully, and ask your doctor or other care provider to review them with you.

## 2017-05-09 ENCOUNTER — OFFICE VISIT (OUTPATIENT)
Dept: PODIATRY | Facility: CLINIC | Age: 82
End: 2017-05-09
Payer: COMMERCIAL

## 2017-05-09 VITALS
BODY MASS INDEX: 37.69 KG/M2 | SYSTOLIC BLOOD PRESSURE: 120 MMHG | WEIGHT: 192 LBS | HEIGHT: 60 IN | DIASTOLIC BLOOD PRESSURE: 80 MMHG

## 2017-05-09 DIAGNOSIS — M19.071 PRIMARY LOCALIZED OSTEOARTHROSIS, ANKLE AND FOOT, RIGHT: ICD-10-CM

## 2017-05-09 DIAGNOSIS — M21.41 PES PLANUS OF BOTH FEET: ICD-10-CM

## 2017-05-09 DIAGNOSIS — M19.072 PRIMARY LOCALIZED OSTEOARTHROSIS, ANKLE AND FOOT, LEFT: ICD-10-CM

## 2017-05-09 DIAGNOSIS — M79.671 PAIN IN BOTH FEET: Primary | ICD-10-CM

## 2017-05-09 DIAGNOSIS — E11.42 DIABETIC POLYNEUROPATHY ASSOCIATED WITH TYPE 2 DIABETES MELLITUS (H): ICD-10-CM

## 2017-05-09 DIAGNOSIS — M79.672 PAIN IN BOTH FEET: Primary | ICD-10-CM

## 2017-05-09 DIAGNOSIS — M21.42 PES PLANUS OF BOTH FEET: ICD-10-CM

## 2017-05-09 DIAGNOSIS — I87.2 VENOUS INSUFFICIENCY OF BOTH LOWER EXTREMITIES: ICD-10-CM

## 2017-05-09 PROCEDURE — 99214 OFFICE O/P EST MOD 30 MIN: CPT | Performed by: PODIATRIST

## 2017-05-09 NOTE — LETTER
5/9/2017       RE: Nataliia Scanlon  [address] [place] [address]           Dear Colleague,    Thank you for referring your patient, Nataliia Scanlon, to the [facility]. Please see a copy of my visit note below.    PATIENT HISTORY:  Nataliia Scanlon is a 81 year old female who presents to clinic for pain to both feet. Notes they just hurt in general with walking. No pain when not walking. Can't describe it. Denies injury. Would like to know what can be done for the pain.     Review of Systems:  Patient denies fever, chills, rash, wound,  heart burn, blood in stool, chest pain with activity, calf pain when walking, shortness of breath with activity, chronic cough, easy bleeding/bruising, excessive thirst, fatigue, depression, anxiety.  Patient admits to limping, swelling, stiffness, weakness.     PAST MEDICAL HISTORY:   Past Medical History:   Diagnosis Date     Aortic sclerosis (H) 2006    check echo every 2 years - last one 11/2013     Atrial fibrillation (H) 2014    Dr. Lamar and she decided NO coumadin 1/2015     Baker's cyst of knee 4/2008    left - small     CHF (congestive heart failure) 12/3/2014     Chronic airway obstruction, not elsewhere classified 2002    moderately severe     Diabetes type 2, controlled (H) 2011     HTN, goal below 140/90 5/21/2003     Hyperlipidaemia      Maxillary fracture (H) 5-14     Mitral regurgitation     1-2+     NONSPECIFIC MEDICAL HISTORY 2006    pt is DNI but not DNR - see living will      Osteoarthritis 4/2009    and bilateral knee steroid injection     Syncope      Tricuspid regurgitation     2-3+     Unspecified essential hypertension      Venous insufficiency 5/19/2010        PAST SURGICAL HISTORY:   Past Surgical History:   Procedure Laterality Date     C NONSPECIFIC PROCEDURE      hosp x 1 for HTN        MEDICATIONS:   Current Outpatient Prescriptions:      traMADol (ULTRAM) 50 MG tablet, TAKE 1 TABLET BY MOUTH EVERY 6 HOURS AS NEEDED FOR  MODERATE PAIN, Disp: 60 tablet, Rfl: 0     ADVAIR DISKUS 250-50 MCG/DOSE diskus inhaler, INHALE 1 PUFF INTO THE LUNGS TWICE DAILY, Disp: 3 Inhaler, Rfl: 1     furosemide (LASIX) 20 MG tablet, TAKE 1 TABLET(20 MG) BY MOUTH TWICE DAILY, Disp: 60 tablet, Rfl: 5     spironolactone (ALDACTONE) 25 MG tablet, TAKE ONE TABLET BY MOUTH EVERY DAY, Disp: 90 tablet, Rfl: 1     carvedilol (COREG) 25 MG tablet, Take 1 tablet (25 mg) by mouth 2 times daily (with meals), Disp: 180 tablet, Rfl: 2     potassium chloride SA (K-DUR/KLOR-CON M) 20 MEQ CR tablet, Take 2 tablets twice daily, Disp: 120 tablet, Rfl: 7     fluticasone (FLONASE) 50 MCG/ACT spray, Spray 1-2 sprays into both nostrils daily, Disp: 1 Bottle, Rfl: 10     aspirin 81 MG tablet, Take 1 tablet (81 mg) by mouth daily, Disp: 100 tablet, Rfl: 3     losartan (COZAAR) 100 MG tablet, Take 1 tablet (100 mg) by mouth daily, Disp: 30 tablet, Rfl: 9     fish oil-omega-3 fatty acids 1000 MG capsule, Take 1 g by mouth daily, Disp: , Rfl:      Magnesium 400 MG CAPS, Take 1 tablet by mouth daily, Disp: , Rfl:      order for DME, Equipment being ordered: diabetic shoe inserts, Disp: 1 each, Rfl: 0     order for DME, Equipment being ordered: diabetic shoes, Disp: 1 each, Rfl: 0     hydrALAZINE (APRESOLINE) 25 MG tablet, Take 2 tablets (50 mg) by mouth daily, Disp: 180 tablet, Rfl: 2     atorvastatin (LIPITOR) 20 MG tablet, Take 1 tablet (20 mg) by mouth daily, Disp: 30 tablet, Rfl: 10     calcium carbonate-vitamin D 500-400 MG-UNIT TABS tablt, Take 1 tablet by mouth 3 times daily (Patient taking differently: Take 1 tablet by mouth 2 times daily ), Disp: 270 tablet, Rfl: 3     Elastic Bandages & Supports (JOBST RELIEF KNEE HIGH/MEDIUM) MISC, 1 each daily, Disp: 1 each, Rfl: 0     albuterol (2.5 MG/3ML) 0.083% nebulizer solution, Take 1 vial (2.5 mg) by nebulization every 6 hours as needed for shortness of breath / dyspnea or wheezing, Disp: 30 vial, Rfl: 0     albuterol (PROAIR HFA,  PROVENTIL HFA, VENTOLIN HFA) 108 (90 BASE) MCG/ACT inhaler, Inhale 2 puffs into the lungs every 6 hours as needed for shortness of breath / dyspnea or wheezing, Disp: 1 Inhaler, Rfl: 0     order for DME, Equipment being ordered: Nebulizer, Disp: 1 Units, Rfl: 0     ascorbic acid (VITAMIN C) 500 MG tablet, Take 500 mg by mouth daily, Disp: , Rfl:      donepezil (ARICEPT) 5 MG tablet, Take 1 tablet by mouth daily Reported on 5/9/2017, Disp: , Rfl:      ipratropium - albuterol 0.5 mg/2.5 mg/3 mL (DUONEB) 0.5-2.5 (3) MG/3ML nebulization, Take 1 vial (3 mLs) by nebulization once for 1 dose, Disp: 3 mL, Rfl: 0     ALLERGIES:    Allergies   Allergen Reactions     No Known Drug Allergies         SOCIAL HISTORY:   Social History     Social History     Marital status: Single     Spouse name: N/A     Number of children: N/A     Years of education: N/A     Occupational History     Not on file.     Social History Main Topics     Smoking status: Never Smoker     Smokeless tobacco: Never Used     Alcohol use No     Drug use: No     Sexual activity: No     Other Topics Concern     Parent/Sibling W/ Cabg, Mi Or Angioplasty Before 65f 55m? No     Caffeine Concern No     decaf     Sleep Concern No     so - so      Weight Concern No     Special Diet No     Exercise No     Social History Narrative        FAMILY HISTORY:   Family History   Problem Relation Age of Onset     Hypertension Mother      Hypertension Sister         EXAM:Vitals: /80  Ht 5' (1.524 m)  Wt 192 lb (87.1 kg)  BMI 37.5 kg/m2  BMI= Body mass index is 37.5 kg/(m^2).     A1C: 5.8    General appearance: Patient is alert and fully cooperative with history & exam.  No sign of distress is noted during the visit.     Psychiatric: Affect is pleasant & appropriate.  Patient appears motivated to improve health.     Respiratory: Breathing is regular & unlabored while sitting.     HEENT: Hearing is intact to spoken word.  Speech is clear.  No gross evidence of visual  impairment that would impact ambulation.     Dermatologic: Skin is intact to both lower extremities without significant lesions, rash or abrasion.  No paronychia or evidence of soft tissue infection is noted.     Vascular: DP & PT pulses are intact & regular bilaterally.   significant edema and varicosities noted.  CFT and skin temperature is normal to both lower extremities.     Neurologic: Lower extremity sensation is diminished.     Musculoskeletal: Patient is ambulatory with cane.  Decrease arch height. No pain on palpation of foot but notes it the whole bottom when she walks     ASSESSMENT:    Pain in both feet  Diabetic polyneuropathy associated with type 2 diabetes mellitus (H)  Pes planus of both feet  Venous insufficiency of both lower extremities  Primary localized osteoarthrosis, ankle and foot, right  Primary localized osteoarthrosis, ankle and foot, left       PLAN:  Reviewed patient's chart in [facility]. Talked about diabetic foot care. Talked about arthritis and treatments including orthotics, injections, icing, NSAIDS, bracing, physical therapy, compounding pain cream, or surgical intervention.    Talked about edema.  Feel it is a combination of all of the above. She notes difficulty getting compression socks on. Was given order for device to help withthis. Was given order for diabetic shoes and inserts. Recommend to wear all the time. Was given order for compounding pain cream. If pain continues, may recommend PT with tim.       Jaelyn Johnson DPM, Podiatry/Foot and Ankle Surgery    Weight management plan: Patient was referred to their PCP to discuss a diet and exercise plan.      Again, thank you for allowing me to participate in the care of your patient.        Sincerely,              Jaelyn Johnson DPM, Podiatry/Foot and Ankle Surgery

## 2017-05-09 NOTE — PATIENT INSTRUCTIONS
DR. YOUNG'S CLINIC SCHEDULE     [facility]  [address] [place] [address]  P: [phone]  F: [phone] [facility]  [address] [place] [address]  P: [phone]  F: [phone] [facility]  [address] [place] [address]  P: [phone]  F: [phone]   FRIDAY AM FRIDAY PM SURGERY   [facility]     [facility]  [address]  [place] [address]  P: [phone] [facility]  [address]  [place] [address]  P: [phone]  F: [phone] Surgery [phone]   Appointment [phone] General After Hours:  [phone] Patient [phone]             Body Mass Index (BMI)  Many things can cause foot and ankle problems. Foot structure, activity level, foot mechanics and injuries are common causes of pain.    One very important issue that often goes unmentioned, is body weight.  Extra weight can cause increased stress on muscles, ligaments, bones and tendons.  Sometimes just a few extra pounds is all it takes to put one over her/his threshold.   Without reducing that stress, it can be difficult to alleviate pain.      Some people are uncomfortable addressing this issue, but we feel it is important for you to think about it.  As Foot &  Ankle specialists, our job is addressing the lower extremity problem and possible causes.     Regarding extra body weight, we encourage patients to discuss diet and weight management plans with their primary care doctors.  It is this team approach that gives you the best opportunity for pain relief and getting you back on your feet.        We talked about a Compound Pain Cream in clinic. This is a topical cream that can be applied to your feet to help with pain and other ailments.       We sent your prescritption to the pharmacy below:    [facility]  [address]  [address]  [place] [address]    They will contact you in roughly 24 hours and inform you about insurance coverage and cost if it is not covered. If you would still like the prescription filled, then, they will ship it to your house at no charge.    If you have any questions, please call:  [phone]              OSTEOARTHRITIS OF THE FOOT & ANKLE  Osteoarthritis is a condition characterized by the breakdown and eventual loss of cartilage in one or more joints. Cartilage (the connective tissue found at the end of the bones in the joints) protects and cushions the bones during movement. When cartilage deteriorates or is lost, symptoms develop that can restrict one s ability to easily perform daily activities.  Osteoarthritis is also known as degenerative arthritis, reflecting its nature to develop as part of the aging process. As the most common form of arthritis, osteoarthritis affects millions of Americans. Some people refer to osteoarthritis simply as arthritis, even though there are many different types of arthritis.  Osteoarthritis appears at various joints throughout the body, including the hands, feet, spine, hips, and knees. In the foot, the disease most frequently occurs in the big toe, although it is also often found in the midfoot and ankle.  CAUSES  Osteoarthritis is considered a  wear and tear  disease because the cartilage in the joint wears down with repeated stress and use over time. As the cartilage deteriorates and gets thinner, the bones lose their protective covering and eventually may rub together, causing pain and inflammation of the joint.  An injury may also lead to osteoarthritis, although it may take months or years after the injury for the condition to develop. For example, osteoarthritis in the big toe is often caused by kicking or jamming the toe, or by dropping something on the toe. Osteoarthritis in the midfoot is often caused by dropping something on it, or by a sprain or fracture.  In the ankle, osteoarthritis is usually caused by a fracture and occasionally by a severe sprain.  Sometimes osteoarthritis develops as a result of abnormal foot mechanics such as flat feet or high arches. A flat foot causes less stability in the ligaments (bands of tissue that connect bones), resulting in excessive strain on the joints, which can cause arthritis. A high arch is rigid and lacks mobility, causing a jamming of joints that creates an increased risk of arthritis.  SYMPTOMS  People with osteoarthritis in the foot or ankle experience, in varying degrees, one or more of the following: Pain and stiffness in the joint, swelling in or near the joint, or difficulty walking or bending the joint.   Some patients with osteoarthritis also develop a bone spur (a bony protrusion) at the affected joint. Shoe pressure may cause pain at the site of a bone spur, and in some cases blisters or calluses may form over its surface. Bone spurs can also limit the movement of the joint.    DIAGNOSIS  In diagnosing osteoarthritis, the foot and ankle surgeon will examine the foot thoroughly, looking for swelling in the joint, limited mobility, and pain with movement. In some cases, deformity and/or enlargement (spur) of the joint may be noted. X-rays may be ordered to evaluate the extent of the disease.  NON-SURGICAL TREATMENT  To help relieve symptoms, the surgeon may begin treating osteoarthritis with one or more of the following non-surgical approaches:  Oral medications. Nonsteroidal anti-inflammatory drugs (NSAIDs), such as ibuprofen, are often helpful in reducing the inflammation and pain. Occasionally a prescription for a steroid medication is needed to adequately reduce symptoms.   Orthotic devices. Custom orthotic devices (shoe inserts) are often prescribed to provide support to improve the foot s mechanics or cushioning to help minimize pain.   Bracing. Bracing, which restricts motion and supports the joint, can reduce  "pain during walking and help prevent further deformity.   Immobilization. Protecting the foot from movement by wearing a cast or removable cast-boot may be necessary to allow the inflammation to resolve.   Steroid injections. In some cases, steroid injections are applied to the affected joint to deliver anti-inflammatory medication.   Physical therapy. Exercises to strengthen the muscles, especially when the osteoarthritis occurs in the ankle, may give the patient greater stability and help avoid injury that might worsen the condition.   DO I NEED SURGERY?  When osteoarthritis has progressed substantially or failed to improve with non-surgical treatment, surgery may be recommended. In advanced cases, surgery may be the only option. The goal of surgery is to decrease pain and improve function. The foot and ankle surgeon will consider a number of factors when selecting the procedure best suited to the patient s condition and lifestyle.  DIABETES AND YOUR FEET    Diabetes can result in several problems in the feet including ulcers (open sores) and amputations. Two of the most important reasons why people develop foot problems when they have diabetes is : 1. Neuropathy (loss of feeling)  2. Vascular disease (loss or decrease of blood flow).    Neuropathy is a term used to describe a loss of nerve function.  Patients with diabetes are at risk of developing neuropathy if their sugars continue to run high and are above the normal value. One theory for neuropathy is that the \"extra\" sugar in the body enters the nerves and is broken down. These by-products build up in the nerve causing it to swell and impairing nerve function. Often times, this can be prevented by controlling your sugars, dieting and exercise.    When a person develops neuropathy, they usually begin to feel numbness or tingling in their feet and sometime in their legs.  Other symptoms may include painful burning or hot feet, tingling or feeling like insects " or ants are crawling on your feet or legs.  If the diabetes is sever and the sugars run high for long periods of time, neuropathy can also occur in the hands.    Vascular disease is a term used to describe a loss or decrease in circulation (blood flow). There is a problem in getting blood and oxygen to areas that need it. Similar to neuropathy, sugars can build up in the walls of the arteries (blood vessels) and cause them to become swollen, thickened and hardened. This decreases the amount of blood that can go to an area that needs it. Though this is common in the legs of diabetic patients, it can also affect other arteries (blood vessels) in the body such as in the heart and eyes.    In the legs, vascular disease usually results in cramping. Patients who develop leg cramps after walking the same distance every time (i.e. One block, half a mile, ect.) need to let their doctors know so that their circulation may be checked. Cramps causing severe pain in the feet and/or legs while sleeping and the cramps go away when you stand or hang your legs off the side of the bed, may also be a sign of poor blood circulation.  Occasional cramping in cold weather or on rare occasions with activity may not be due to poor circulation, but you should inform your doctor.    PREVENTION OF THESE DISEASES  The key to prevention is good blood sugar control. Poor blood sugar control is a big reason many of these problems start. Physical activity (exercise) is a very good way to help decrease your blood sugars. Exercise can lower your blood sugar, blood pressure, and cholesterol. It also reduces your risk for heart disease and stroke, relieves stress, and strengthens your heart, muscles and bones.  In addition, regular activity helps insulin work better, improves your blood circulation, and keeps your joints flexible. If you're trying to lose weight, a combination of exercise and wise food choices can help you reach your target weight and  maintain it.      PAIN MANAGEMENT  1.Blood Sugar Control - Most important  2. Medications such as:  Amytriptylline, duloxetine, gabapentin, lyrica, tramadol  3. Nutritional therapy:  Vitamin B6 (100mg daily), Vitamin B12 (75mcg daily), Vitamin D 2000 IU daily), Alpha-Lipoic Acid (600-1800mg daily), Acetyl-L-Carnitine (500-1000mg TID, L-methyl folate (1500mcg daily)    ** Metformin can block Vitamin B6 and B12 so it is important to supplement**    FOOT CARE RECOMMENDATIONS   1. Wash your feet with lukewarm water and a mild soap and then dry them thoroughly, especially between the toes.     2. Examine your feet daily looking for cuts, corns, blisters, cracks, ect, especially after wearing new shoes. Make sure to look between your toes. If you cannot see the bottom of your feet, set a mirror on the floor and hold your foot over it, or ask a spouse, friend or family member to examine your feet for you. Contact your doctor immediately if new problems are noted or if sores are not healing.     3. Immediately apply moisturizer to the tops and bottoms of your feet, avoiding areas between the toes. Hand lotion (Intesive Care, Sonja, Eucerin, Neutrogena, Curel, ect) is sufficient unless your doctor prescribes a medicated lotion. Apply sunscreen to your feet when going swimming outside.     4. Use clean comfortable shoes, wear white socks (if you have any bleeding or drainage, you will see it on white socks). Socks should not have thick seams or cut off the circulation around the leg. Break in new shoes slowly and rotate with older shoes until broken in. Check the inside of your shoes with your hand to look for areas of irritation or objects that may have fallen into your shoes.       5. Keep slippers by the side of your bed for use during the night.     6.  Shoes should be fitted by a professional and should not cause areas of irritation.  Check your feet regularly when wearing a new pair of shoes and replace them as  needed.     7.  Talk to your doctor about proper exercise. Exercise and stretching stimulate blood flow to your feet and maintain proper glucose levels.     8.  Monitor your blood glucose level as instructed by your doctor. Notify your doctor immediately if your blood sugar is abnormally high or low.    9. Cut your nails straight across, but then gently round any sharp edges with a cardboard nail file. If you have neuropathy, peripheral vascular disease or cannot see that well to trim your own toenails contact [employer] ([phone]) or [employer] ([phone]).      THINGS TO AVOID DOING   1.  Do not soak your feet if you have an open sore. Use only lukewarm water and always check the temperature with your hand as hot water can easily burn your feet.       2.  Never use a hot water bottle or heating pad on your feet. Also do not apply cold compresses to your feet. With decreased sensation, you could burn or freeze your feet.       3.  Do not apply any of these to your feet:    -  Over the counter medicine for corns or warts    -  Harsh chemicals like boric acid    -  Do not self-treat corns, cuts, blisters or infections. Always consult your doctor.       4.  Do not wear sandals, slippers or walk barefoot, especially on hot sand or concrete or other harsh surfaces.     5.  If you smoke, stop!!!        Home Medical Equipment:    1.  [facility] - [address] [facility] [place] [facility], [phone]  2. [facility]    [facility] -[address]  [address]: [facility].   [place] [phone]   3.  [facility] (knee walkers and power scooters) -  [address], [place], [phone] or  [address] [facility] [address] [place], [phone].  4. [place] [facility] - [address] [place], [phone]  5. [facility], [address] [place] [id number].  [phone]

## 2017-05-09 NOTE — MR AVS SNAPSHOT
After Visit Summary   2017    Nataliia Scanlon    MRN: [id number]           Patient Information     Date Of Birth          1935        Visit Information        Provider Department      2017 2:15 PM Jaelyn Johnson DPM, Podiatry/Foot and Ankle Surgery [facility]        Today's Diagnoses     Pain in both feet    -  1    Diabetic polyneuropathy associated with type 2 diabetes mellitus (H)        Pes planus of both feet        Venous insufficiency of both lower extremities          Care Instructions    DR. JOHNSON'S CLINIC SCHEDULE     [facility]  [address] [place] [address]  P: [phone]  F: [phone] [facility]  [address] [place] [address]  P: [phone]  F: [phone] [facility]  [address] [place] [address]  P: [phone]  F: [phone]   FRIDAY AM FRIDAY PM SURGERY   [facility]     [facility] [place]  [address]  [place] [address]  P: [phone] [facility]  [address]  [place] [address]  P: [phone]  F: [phone] Surgery [phone]   Appointment [phone] General After Hours:  [phone] Patient [phone]             Body Mass Index (BMI)  Many things can cause foot and ankle problems. Foot structure, activity level, foot mechanics and injuries are common causes of pain.    One very important issue that often goes unmentioned, is body weight.  Extra weight can cause increased stress on muscles, ligaments, bones and tendons.  Sometimes just a few extra pounds is all it takes to put one over her/his threshold.   Without reducing that stress, it can be difficult to alleviate pain.      Some people are uncomfortable addressing this issue, but we feel it is important for you to think about it.  As Foot &  Ankle specialists, our job is addressing the  lower extremity problem and possible causes.     Regarding extra body weight, we encourage patients to discuss diet and weight management plans with their primary care doctors.  It is this team approach that gives you the best opportunity for pain relief and getting you back on your feet.        We talked about a Compound Pain Cream in clinic. This is a topical cream that can be applied to your feet to help with pain and other ailments.       We sent your prescritption to the pharmacy below:    [facility]  [address]  [address]  [place] [address]    They will contact you in roughly 24 hours and inform you about insurance coverage and cost if it is not covered. If you would still like the prescription filled, then, they will ship it to your house at no charge.    If you have any questions, please call:  [phone]              OSTEOARTHRITIS OF THE FOOT & ANKLE  Osteoarthritis is a condition characterized by the breakdown and eventual loss of cartilage in one or more joints. Cartilage (the connective tissue found at the end of the bones in the joints) protects and cushions the bones during movement. When cartilage deteriorates or is lost, symptoms develop that can restrict one s ability to easily perform daily activities.  Osteoarthritis is also known as degenerative arthritis, reflecting its nature to develop as part of the aging process. As the most common form of arthritis, osteoarthritis affects millions of Americans. Some people refer to osteoarthritis simply as arthritis, even though there are many different types of arthritis.  Osteoarthritis appears at various joints throughout the body, including the hands, feet, spine, hips, and knees. In the foot, the disease most frequently occurs in the big toe, although it is also often found in the midfoot and ankle.  CAUSES  Osteoarthritis is considered a  wear and tear  disease because the cartilage in the joint wears down with repeated  stress and use over time. As the cartilage deteriorates and gets thinner, the bones lose their protective covering and eventually may rub together, causing pain and inflammation of the joint.  An injury may also lead to osteoarthritis, although it may take months or years after the injury for the condition to develop. For example, osteoarthritis in the big toe is often caused by kicking or jamming the toe, or by dropping something on the toe. Osteoarthritis in the midfoot is often caused by dropping something on it, or by a sprain or fracture. In the ankle, osteoarthritis is usually caused by a fracture and occasionally by a severe sprain.  Sometimes osteoarthritis develops as a result of abnormal foot mechanics such as flat feet or high arches. A flat foot causes less stability in the ligaments (bands of tissue that connect bones), resulting in excessive strain on the joints, which can cause arthritis. A high arch is rigid and lacks mobility, causing a jamming of joints that creates an increased risk of arthritis.  SYMPTOMS  People with osteoarthritis in the foot or ankle experience, in varying degrees, one or more of the following: Pain and stiffness in the joint, swelling in or near the joint, or difficulty walking or bending the joint.   Some patients with osteoarthritis also develop a bone spur (a bony protrusion) at the affected joint. Shoe pressure may cause pain at the site of a bone spur, and in some cases blisters or calluses may form over its surface. Bone spurs can also limit the movement of the joint.    DIAGNOSIS  In diagnosing osteoarthritis, the foot and ankle surgeon will examine the foot thoroughly, looking for swelling in the joint, limited mobility, and pain with movement. In some cases, deformity and/or enlargement (spur) of the joint may be noted. X-rays may be ordered to evaluate the extent of the disease.  NON-SURGICAL TREATMENT  To help relieve symptoms, the surgeon may begin treating  osteoarthritis with one or more of the following non-surgical approaches:  Oral medications. Nonsteroidal anti-inflammatory drugs (NSAIDs), such as ibuprofen, are often helpful in reducing the inflammation and pain. Occasionally a prescription for a steroid medication is needed to adequately reduce symptoms.   Orthotic devices. Custom orthotic devices (shoe inserts) are often prescribed to provide support to improve the foot s mechanics or cushioning to help minimize pain.   Bracing. Bracing, which restricts motion and supports the joint, can reduce pain during walking and help prevent further deformity.   Immobilization. Protecting the foot from movement by wearing a cast or removable cast-boot may be necessary to allow the inflammation to resolve.   Steroid injections. In some cases, steroid injections are applied to the affected joint to deliver anti-inflammatory medication.   Physical therapy. Exercises to strengthen the muscles, especially when the osteoarthritis occurs in the ankle, may give the patient greater stability and help avoid injury that might worsen the condition.   DO I NEED SURGERY?  When osteoarthritis has progressed substantially or failed to improve with non-surgical treatment, surgery may be recommended. In advanced cases, surgery may be the only option. The goal of surgery is to decrease pain and improve function. The foot and ankle surgeon will consider a number of factors when selecting the procedure best suited to the patient s condition and lifestyle.  DIABETES AND YOUR FEET    Diabetes can result in several problems in the feet including ulcers (open sores) and amputations. Two of the most important reasons why people develop foot problems when they have diabetes is : 1. Neuropathy (loss of feeling)  2. Vascular disease (loss or decrease of blood flow).    Neuropathy is a term used to describe a loss of nerve function.  Patients with diabetes are at risk of developing neuropathy if their  "sugars continue to run high and are above the normal value. One theory for neuropathy is that the \"extra\" sugar in the body enters the nerves and is broken down. These by-products build up in the nerve causing it to swell and impairing nerve function. Often times, this can be prevented by controlling your sugars, dieting and exercise.    When a person develops neuropathy, they usually begin to feel numbness or tingling in their feet and sometime in their legs.  Other symptoms may include painful burning or hot feet, tingling or feeling like insects or ants are crawling on your feet or legs.  If the diabetes is sever and the sugars run high for long periods of time, neuropathy can also occur in the hands.    Vascular disease is a term used to describe a loss or decrease in circulation (blood flow). There is a problem in getting blood and oxygen to areas that need it. Similar to neuropathy, sugars can build up in the walls of the arteries (blood vessels) and cause them to become swollen, thickened and hardened. This decreases the amount of blood that can go to an area that needs it. Though this is common in the legs of diabetic patients, it can also affect other arteries (blood vessels) in the body such as in the heart and eyes.    In the legs, vascular disease usually results in cramping. Patients who develop leg cramps after walking the same distance every time (i.e. One block, half a mile, ect.) need to let their doctors know so that their circulation may be checked. Cramps causing severe pain in the feet and/or legs while sleeping and the cramps go away when you stand or hang your legs off the side of the bed, may also be a sign of poor blood circulation.  Occasional cramping in cold weather or on rare occasions with activity may not be due to poor circulation, but you should inform your doctor.    PREVENTION OF THESE DISEASES  The key to prevention is good blood sugar control. Poor blood sugar control is a big " reason many of these problems start. Physical activity (exercise) is a very good way to help decrease your blood sugars. Exercise can lower your blood sugar, blood pressure, and cholesterol. It also reduces your risk for heart disease and stroke, relieves stress, and strengthens your heart, muscles and bones.  In addition, regular activity helps insulin work better, improves your blood circulation, and keeps your joints flexible. If you're trying to lose weight, a combination of exercise and wise food choices can help you reach your target weight and maintain it.      PAIN MANAGEMENT  1.Blood Sugar Control - Most important  2. Medications such as:  Amytriptylline, duloxetine, gabapentin, lyrica, tramadol  3. Nutritional therapy:  Vitamin B6 (100mg daily), Vitamin B12 (75mcg daily), Vitamin D 2000 IU daily), Alpha-Lipoic Acid (600-1800mg daily), Acetyl-L-Carnitine (500-1000mg TID, L-methyl folate (1500mcg daily)    ** Metformin can block Vitamin B6 and B12 so it is important to supplement**    FOOT CARE RECOMMENDATIONS   1. Wash your feet with lukewarm water and a mild soap and then dry them thoroughly, especially between the toes.     2. Examine your feet daily looking for cuts, corns, blisters, cracks, ect, especially after wearing new shoes. Make sure to look between your toes. If you cannot see the bottom of your feet, set a mirror on the floor and hold your foot over it, or ask a spouse, friend or family member to examine your feet for you. Contact your doctor immediately if new problems are noted or if sores are not healing.     3. Immediately apply moisturizer to the tops and bottoms of your feet, avoiding areas between the toes. Hand lotion (Intesive Care, Sonja, Eucerin, Neutrogena, Curel, ect) is sufficient unless your doctor prescribes a medicated lotion. Apply sunscreen to your feet when going swimming outside.     4. Use clean comfortable shoes, wear white socks (if you have any bleeding or drainage, you  will see it on white socks). Socks should not have thick seams or cut off the circulation around the leg. Break in new shoes slowly and rotate with older shoes until broken in. Check the inside of your shoes with your hand to look for areas of irritation or objects that may have fallen into your shoes.       5. Keep slippers by the side of your bed for use during the night.     6.  Shoes should be fitted by a professional and should not cause areas of irritation.  Check your feet regularly when wearing a new pair of shoes and replace them as needed.     7.  Talk to your doctor about proper exercise. Exercise and stretching stimulate blood flow to your feet and maintain proper glucose levels.     8.  Monitor your blood glucose level as instructed by your doctor. Notify your doctor immediately if your blood sugar is abnormally high or low.    9. Cut your nails straight across, but then gently round any sharp edges with a cardboard nail file. If you have neuropathy, peripheral vascular disease or cannot see that well to trim your own toenails contact [employer] ([phone]) or [employer] ([phone]).      THINGS TO AVOID DOING   1.  Do not soak your feet if you have an open sore. Use only lukewarm water and always check the temperature with your hand as hot water can easily burn your feet.       2.  Never use a hot water bottle or heating pad on your feet. Also do not apply cold compresses to your feet. With decreased sensation, you could burn or freeze your feet.       3.  Do not apply any of these to your feet:    -  Over the counter medicine for corns or warts    -  Harsh chemicals like boric acid    -  Do not self-treat corns, cuts, blisters or infections. Always consult your doctor.       4.  Do not wear sandals, slippers or walk barefoot, especially on hot sand or concrete or other harsh surfaces.     5.  If you smoke, stop!!!        Home Medical Equipment:    1.  [employer] - [address]  [place], [phone]  2. [facility]    [facility] -[address]  [address]   [place] [phone]   3.  [employer] (knee walkers and power scooters) -  [address], [place], [phone] or  [address], [phone].  4. [facility] - [address], [place], [phone]  5. [facility], [address] [place] [address].  [phone]                  Follow-ups after your visit        Additional Services     ORTHOTICS REFERRAL       Please be aware that coverage of these services is subject to the terms and limitations of your health insurance plan.  Call member services at your health plan with any benefit or coverage questions.      Please bring the following to your appointment:    >>   Any x-rays, CTs or MRIs which have been performed.  Contact the facility where they were done to arrange for  prior to your scheduled appointment.  Any new CT, MRI or other procedures ordered by your specialist must be performed at a [facility] facility or coordinated by your clinic's referral office.    >>   List of current medications   >>   This referral request   >>   Any documents/labs given to you for this referral    ==This Referral PRINTS in the [facility] (ORTHOTICS & PROSTHETICS) Central scheduling office ==     The [facility] Central Scheduling staff will contact patient to arrange appointments. Central Scheduling Phone #:  CATIA Coronado  [phone]     Orthotics: Foot Orthotics with arch support                  Your next 10 appointments already scheduled     Jun 12, 2017  9:30 AM CDT   SHORT with Timoteo Ramos RPH   [facility])    [address] [address] [phone]            Sep 18, 2017 10:30 AM CDT   SHORT with Mari Dunaway MD   [place]  [facility]    [address] [id number] [phone]              Who to contact     If you have questions or need follow up information about today's clinic visit or your schedule please contact [facility] directly at [phone].  Normal or non-critical lab and imaging results will be communicated to you by MyChart, letter or phone within 4 business days after the clinic has received the results. If you do not hear from us within 7 days, please contact the clinic through MyChart or phone. If you have a critical or abnormal lab result, we will notify you by phone as soon as possible.  Submit refill requests through [facility] or call your pharmacy and they will forward the refill request to us. Please allow 3 business days for your refill to be completed.          Additional Information About Your Visit        [employer] Information     [facility] lets you send messages to your doctor, view your test results, renew your prescriptions, schedule appointments and more. To sign up, go to [email or web address] . Click on \"Log in\" on the left side of the screen, which will take you to the Welcome page. Then click on \"Sign up Now\" on the right side of the page.     You will be asked to enter the access code listed below, as well as some personal information. Please follow the directions to create your username and password.     Your access code is: [email or web address]  Expires: 2017  9:44 AM     Your access code will  in 90 days. If you need help or a new code, please call your [facility] or [phone].        Care EveryWhere ID     This is your Care EveryWhere ID. This could be used by other organizations to access your [place] medical records  [id number]        Your Vitals Were     Height BMI (Body Mass Index)                5' (1.524 m) 37.5 kg/m2           Blood Pressure from Last 3 Encounters:   17 120/80 "   05/06/17 98/63   03/15/17 118/70    Weight from Last 3 Encounters:   05/09/17 192 lb (87.1 kg)   03/15/17 188 lb 9.6 oz (85.5 kg)   12/15/16 190 lb 12.8 oz (86.5 kg)              We Performed the Following     ORTHOTICS REFERRAL          Today's Medication Changes          These changes are accurate as of: 5/9/17  2:36 PM.  If you have any questions, ask your nurse or doctor.               These medicines have changed or have updated prescriptions.        Dose/Directions    calcium carbonate-vitamin D 500-400 MG-UNIT Tabs per tablet   This may have changed:  when to take this   Used for:  Osteopenia        Dose:  1 tablet   Take 1 tablet by mouth 3 times daily   Quantity:  270 tablet   Refills:  3                Primary Care Provider Office Phone # Fax #    Mari Dunaway [phone]       [facility] [address] [address]        Thank you!     Thank you for choosing [facility]  for your care. Our goal is always to provide you with excellent care. Hearing back from our patients is one way we can continue to improve our services. Please take a few minutes to complete the written survey that you may receive in the mail after your visit with us. Thank you!             Your Updated Medication List - Protect others around you: Learn how to safely use, store and throw away your medicines at www.disposemymeds.org.          This list is accurate as of: 5/9/17  2:36 PM.  Always use your most recent med list.                   Brand Name Dispense Instructions for use    ADVAIR DISKUS 250-50 MCG/DOSE diskus inhaler   Generic drug:  fluticasone-salmeterol     3 Inhaler    INHALE 1 PUFF INTO THE LUNGS TWICE DAILY       * albuterol 108 (90 BASE) MCG/ACT Inhaler    PROAIR HFA/PROVENTIL HFA/VENTOLIN HFA    1 Inhaler    Inhale 2 puffs into the lungs every 6 hours as needed for shortness of breath / dyspnea or wheezing       * albuterol (2.5 MG/3ML) 0.083% neb  solution     30 vial    Take 1 vial (2.5 mg) by nebulization every 6 hours as needed for shortness of breath / dyspnea or wheezing       ascorbic acid 500 MG tablet    VITAMIN C     Take 500 mg by mouth daily       aspirin 81 MG tablet     100 tablet    Take 1 tablet (81 mg) by mouth daily       atorvastatin 20 MG tablet    LIPITOR    30 tablet    Take 1 tablet (20 mg) by mouth daily       calcium carbonate-vitamin D 500-400 MG-UNIT Tabs per tablet     270 tablet    Take 1 tablet by mouth 3 times daily       carvedilol 25 MG tablet    COREG    180 tablet    Take 1 tablet (25 mg) by mouth 2 times daily (with meals)       donepezil 5 MG tablet    ARIcept     Take 1 tablet by mouth daily Reported on 5/9/2017       fish oil-omega-3 fatty acids 1000 MG capsule      Take 1 g by mouth daily       fluticasone 50 MCG/ACT spray    FLONASE    1 Bottle    Spray 1-2 sprays into both nostrils daily       furosemide 20 MG tablet    LASIX    60 tablet    TAKE 1 TABLET(20 MG) BY MOUTH TWICE DAILY       hydrALAZINE 25 MG tablet    APRESOLINE    180 tablet    Take 2 tablets (50 mg) by mouth daily       ipratropium - albuterol 0.5 mg/2.5 mg/3 mL 0.5-2.5 (3) MG/3ML neb solution    DUONEB    3 mL    Take 1 vial (3 mLs) by nebulization once for 1 dose       JOBST RELIEF KNEE HIGH/MEDIUM Misc     1 each    1 each daily       losartan 100 MG tablet    COZAAR    30 tablet    Take 1 tablet (100 mg) by mouth daily       Magnesium 400 MG Caps      Take 1 tablet by mouth daily       * order for DME     1 Units    Equipment being ordered: Nebulizer       * order for DME     1 each    Equipment being ordered: diabetic shoe inserts       * order for DME     1 each    Equipment being ordered: diabetic shoes       potassium chloride SA 20 MEQ CR tablet    K-DUR/KLOR-CON M    120 tablet    Take 2 tablets twice daily       spironolactone 25 MG tablet    ALDACTONE    90 tablet    TAKE ONE TABLET BY MOUTH EVERY DAY       traMADol 50 MG tablet    ULTRAM     60 tablet    TAKE 1 TABLET BY MOUTH EVERY 6 HOURS AS NEEDED FOR MODERATE PAIN       * Notice:  This list has 5 medication(s) that are the same as other medications prescribed for you. Read the directions carefully, and ask your doctor or other care provider to review them with you.

## 2017-05-09 NOTE — PROGRESS NOTES
PATIENT HISTORY:  Nataliia Scanlon is a 81 year old female who presents to clinic for pain to both feet. Notes they just hurt in general with walking. No pain when not walking. Can't describe it. Denies injury. Would like to know what can be done for the pain.     Review of Systems:  Patient denies fever, chills, rash, wound,  heart burn, blood in stool, chest pain with activity, calf pain when walking, shortness of breath with activity, chronic cough, easy bleeding/bruising, excessive thirst, fatigue, depression, anxiety.  Patient admits to limping, swelling, stiffness, weakness.     PAST MEDICAL HISTORY:   Past Medical History:   Diagnosis Date     Aortic sclerosis (H) 2006    check echo every 2 years - last one 11/2013     Atrial fibrillation (H) 2014    Dr. Lamar and she decided NO coumadin 1/2015     Baker's cyst of knee 4/2008    left - small     CHF (congestive heart failure) 12/3/2014     Chronic airway obstruction, not elsewhere classified 2002    moderately severe     Diabetes type 2, controlled (H) 2011     HTN, goal below 140/90 5/21/2003     Hyperlipidaemia      Maxillary fracture (H) 5-14     Mitral regurgitation     1-2+     NONSPECIFIC MEDICAL HISTORY 2006    pt is DNI but not DNR - see living will      Osteoarthritis 4/2009    and bilateral knee steroid injection     Syncope      Tricuspid regurgitation     2-3+     Unspecified essential hypertension      Venous insufficiency 5/19/2010        PAST SURGICAL HISTORY:   Past Surgical History:   Procedure Laterality Date     C NONSPECIFIC PROCEDURE      hosp x 1 for HTN        MEDICATIONS:   Current Outpatient Prescriptions:      traMADol (ULTRAM) 50 MG tablet, TAKE 1 TABLET BY MOUTH EVERY 6 HOURS AS NEEDED FOR MODERATE PAIN, Disp: 60 tablet, Rfl: 0     ADVAIR DISKUS 250-50 MCG/DOSE diskus inhaler, INHALE 1 PUFF INTO THE LUNGS TWICE DAILY, Disp: 3 Inhaler, Rfl: 1     furosemide (LASIX) 20 MG tablet, TAKE 1 TABLET(20 MG) BY MOUTH TWICE DAILY, Disp: 60  tablet, Rfl: 5     spironolactone (ALDACTONE) 25 MG tablet, TAKE ONE TABLET BY MOUTH EVERY DAY, Disp: 90 tablet, Rfl: 1     carvedilol (COREG) 25 MG tablet, Take 1 tablet (25 mg) by mouth 2 times daily (with meals), Disp: 180 tablet, Rfl: 2     potassium chloride SA (K-DUR/KLOR-CON M) 20 MEQ CR tablet, Take 2 tablets twice daily, Disp: 120 tablet, Rfl: 7     fluticasone (FLONASE) 50 MCG/ACT spray, Spray 1-2 sprays into both nostrils daily, Disp: 1 Bottle, Rfl: 10     aspirin 81 MG tablet, Take 1 tablet (81 mg) by mouth daily, Disp: 100 tablet, Rfl: 3     losartan (COZAAR) 100 MG tablet, Take 1 tablet (100 mg) by mouth daily, Disp: 30 tablet, Rfl: 9     fish oil-omega-3 fatty acids 1000 MG capsule, Take 1 g by mouth daily, Disp: , Rfl:      Magnesium 400 MG CAPS, Take 1 tablet by mouth daily, Disp: , Rfl:      order for DME, Equipment being ordered: diabetic shoe inserts, Disp: 1 each, Rfl: 0     order for DME, Equipment being ordered: diabetic shoes, Disp: 1 each, Rfl: 0     hydrALAZINE (APRESOLINE) 25 MG tablet, Take 2 tablets (50 mg) by mouth daily, Disp: 180 tablet, Rfl: 2     atorvastatin (LIPITOR) 20 MG tablet, Take 1 tablet (20 mg) by mouth daily, Disp: 30 tablet, Rfl: 10     calcium carbonate-vitamin D 500-400 MG-UNIT TABS tablt, Take 1 tablet by mouth 3 times daily (Patient taking differently: Take 1 tablet by mouth 2 times daily ), Disp: 270 tablet, Rfl: 3     Elastic Bandages & Supports (JOBST RELIEF KNEE HIGH/MEDIUM) MISC, 1 each daily, Disp: 1 each, Rfl: 0     albuterol (2.5 MG/3ML) 0.083% nebulizer solution, Take 1 vial (2.5 mg) by nebulization every 6 hours as needed for shortness of breath / dyspnea or wheezing, Disp: 30 vial, Rfl: 0     albuterol (PROAIR HFA, PROVENTIL HFA, VENTOLIN HFA) 108 (90 BASE) MCG/ACT inhaler, Inhale 2 puffs into the lungs every 6 hours as needed for shortness of breath / dyspnea or wheezing, Disp: 1 Inhaler, Rfl: 0     order for DME, Equipment being ordered: Nebulizer, Disp:  1 Units, Rfl: 0     ascorbic acid (VITAMIN C) 500 MG tablet, Take 500 mg by mouth daily, Disp: , Rfl:      donepezil (ARICEPT) 5 MG tablet, Take 1 tablet by mouth daily Reported on 5/9/2017, Disp: , Rfl:      ipratropium - albuterol 0.5 mg/2.5 mg/3 mL (DUONEB) 0.5-2.5 (3) MG/3ML nebulization, Take 1 vial (3 mLs) by nebulization once for 1 dose, Disp: 3 mL, Rfl: 0     ALLERGIES:    Allergies   Allergen Reactions     No Known Drug Allergies         SOCIAL HISTORY:   Social History     Social History     Marital status: Single     Spouse name: N/A     Number of children: N/A     Years of education: N/A     Occupational History     Not on file.     Social History Main Topics     Smoking status: Never Smoker     Smokeless tobacco: Never Used     Alcohol use No     Drug use: No     Sexual activity: No     Other Topics Concern     Parent/Sibling W/ Cabg, Mi Or Angioplasty Before 65f 55m? No     Caffeine Concern No     decaf     Sleep Concern No     so - so      Weight Concern No     Special Diet No     Exercise No     Social History Narrative        FAMILY HISTORY:   Family History   Problem Relation Age of Onset     Hypertension Mother      Hypertension Sister         EXAM:Vitals: /80  Ht 5' (1.524 m)  Wt 192 lb (87.1 kg)  BMI 37.5 kg/m2  BMI= Body mass index is 37.5 kg/(m^2).     A1C: 5.8    General appearance: Patient is alert and fully cooperative with history & exam.  No sign of distress is noted during the visit.     Psychiatric: Affect is pleasant & appropriate.  Patient appears motivated to improve health.     Respiratory: Breathing is regular & unlabored while sitting.     HEENT: Hearing is intact to spoken word.  Speech is clear.  No gross evidence of visual impairment that would impact ambulation.     Dermatologic: Skin is intact to both lower extremities without significant lesions, rash or abrasion.  No paronychia or evidence of soft tissue infection is noted.     Vascular: DP & PT pulses are intact &  regular bilaterally.   significant edema and varicosities noted.  CFT and skin temperature is normal to both lower extremities.     Neurologic: Lower extremity sensation is diminished.     Musculoskeletal: Patient is ambulatory with cane.  Decrease arch height. No pain on palpation of foot but notes it the whole bottom when she walks     ASSESSMENT:    Pain in both feet  Diabetic polyneuropathy associated with type 2 diabetes mellitus (H)  Pes planus of both feet  Venous insufficiency of both lower extremities  Primary localized osteoarthrosis, ankle and foot, right  Primary localized osteoarthrosis, ankle and foot, left       PLAN:  Reviewed patient's chart in [facility]. Talked about diabetic foot care. Talked about arthritis and treatments including orthotics, injections, icing, NSAIDS, bracing, physical therapy, compounding pain cream, or surgical intervention.    Talked about edema.  Feel it is a combination of all of the above. She notes difficulty getting compression socks on. Was given order for device to help withthis. Was given order for diabetic shoes and inserts. Recommend to wear all the time. Was given order for compounding pain cream. If pain continues, may recommend PT with tim.       Jaelyn Johnson DPM, Podiatry/Foot and Ankle Surgery    Weight management plan: Patient was referred to their PCP to discuss a diet and exercise plan.

## 2017-05-09 NOTE — NURSING NOTE
Chief Complaint   Patient presents with     Foot Pain     bottom of both feet hurt x2weeks        Initial /80  Ht 5' (1.524 m)  Wt 192 lb (87.1 kg)  BMI 37.5 kg/m2 Estimated body mass index is 37.5 kg/(m^2) as calculated from the following:    Height as of this encounter: 5' (1.524 m).    Weight as of this encounter: 192 lb (87.1 kg).  Medication Reconciliation: complete   Giovanny Saldana MA

## 2017-05-30 DIAGNOSIS — M79.603 PAIN OF UPPER EXTREMITY, UNSPECIFIED LATERALITY: ICD-10-CM

## 2017-05-30 NOTE — TELEPHONE ENCOUNTER
Pending Prescriptions:                       Disp   Refills    traMADol (ULTRAM) 50 MG tablet [Pharmacy *60 tab*0            Sig: TAKE 1 TABLET BY MOUTH EVERY 6 HOURS AS NEEDED           FOR MODERATE PAIN                Last Written Prescription Date:  4/5/2017  Last Fill Quantity: 60,   # refills: 0  Last Office Visit with [facility] or [facility] prescribing provider: 3/15/2017, Gume      Future Office visit:    Next 5 appointments (look out 90 days)     Jun 12, 2017  9:30 AM CDT   SHORT with Timoteo Ramos RPH   [facility] ([facility])    [address]  [id number]   [phone]                   Routing refill request to provider for review/approval because:  Drug not on the [facility], [facility] or  Health refill protocol or controlled substance

## 2017-05-31 RX ORDER — TRAMADOL HYDROCHLORIDE 50 MG/1
TABLET ORAL
Qty: 60 TABLET | Refills: 0 | Status: SHIPPED | OUTPATIENT
Start: 2017-05-31 | End: 2017-07-27

## 2017-05-31 NOTE — TELEPHONE ENCOUNTER
Faxed Tramadol Rx to [facility] [address] [place] [address]. [phone]. Ruth Behrens.

## 2017-06-11 DIAGNOSIS — M85.80 OSTEOPENIA: ICD-10-CM

## 2017-06-11 NOTE — TELEPHONE ENCOUNTER
Pending Prescriptions:                       Disp   Refills    CALCIUM 500+D HIGH POTENCY 500-400 MG-UNI*270 ta*0            Sig: TAKE 1 TABLET BY MOUTH THREE TIMES DAILY              Last Written Prescription Date: 5/17/2016  Last Fill Quantity: 270, # refills: 3  Last Office Visit with [facility], [facility] or [facility] prescribing provider: 3/15/2017, Gume        Next 5 appointments (look out 90 days)     Jun 12, 2017  9:30 AM CDT   SHORT with Timoteo Ramos RPH   [facility] ([facility])    [address] [facility] [address] [phone]                   DEXA Scan:  No order of DX HIP/PELVIS/SPINE is found.     Last order of DX HIP/PELVIS/SPINE W LAT FRACTION ANALYSIS was found on 6/5/2012 from Orders Only on 6/5/2012       Creatinine   Date Value Ref Range Status   03/15/2017 0.78 0.52 - 1.04 mg/dL Final

## 2017-06-27 DIAGNOSIS — I10 ESSENTIAL HYPERTENSION, BENIGN: ICD-10-CM

## 2017-06-27 RX ORDER — HYDRALAZINE HYDROCHLORIDE 25 MG/1
TABLET, FILM COATED ORAL
Qty: 180 TABLET | Refills: 2 | Status: SHIPPED | OUTPATIENT
Start: 2017-06-27

## 2017-06-27 NOTE — TELEPHONE ENCOUNTER
Hydralazine      Last Written Prescription Date: 10/17/16  Last Fill Quantity: 180, # refills: 2    Last Office Visit with [facility], [facility] or [facility] prescribing provider:  3/15/17   Future Office Visit:    Next 5 appointments (look out 90 days)     Sep 18, 2017 10:30 AM CDT   SHORT with Mari Dunaway MD   [facility]    [address] [place] [address] [phone]                    BP Readings from Last 3 Encounters:   05/09/17 120/80   05/06/17 98/63   03/15/17 118/70     Medication approved per standing orders.     Melissa Henry RN

## 2017-07-03 ENCOUNTER — CARE COORDINATION (OUTPATIENT)
Dept: GERIATRIC MEDICINE | Facility: CLINIC | Age: 82
End: 2017-07-03

## 2017-07-03 NOTE — PROGRESS NOTES
6/28/17 Late entry, out of office message.  Rec'd vm from Ana at [facility] stating that per client's niece, client will begin to use [employer] in the near future. Ana inquired if client will be managing or the ADC.  Ana requests a call back [phone].  7/3/2017 Left vm with client's niece, Nikia requesting a return call.  7/3/2017 Call placed to [facility], spoke with Adriana to report that to CM knowledge, client's family or CM will make arrangements for MM transportation. Explained that CM left message for Nikia, if this will change, CM to be in contact with the ADC.  Anna Ely RN, BC  Supervisor [facility]   [phone] (Fax)

## 2017-07-06 NOTE — PROGRESS NOTES
Rec'd information that number in EPIC is not the correct phone number for client's niece Nikia.  CM left  with cell phone number for iNkia, introduced myself and requested a return call to discuss ADC transportation.  Anna Ely RN, BC  Supervisor [facility]   [phone] (Fax)

## 2017-07-06 NOTE — PROGRESS NOTES
Rec'd tele call from client's niece EILEEN Montejo to update EPIC with correct contact info.  Nikia states that client does have [employer] and the Go To Card, stating that she did complete the form for standing transportation M-W-F.  Explained that CM can assist with transportation if she has concerns or questions.  Anna Ely RN, BC  Supervisor [facility]   [phone] (Fax)

## 2017-07-10 ENCOUNTER — TELEPHONE (OUTPATIENT)
Dept: FAMILY MEDICINE | Facility: CLINIC | Age: 82
End: 2017-07-10

## 2017-07-10 NOTE — TELEPHONE ENCOUNTER
Fax from [facility] regarding MTM visit.  Fax to Dr. Galeano to review.  Melissa Henry RN

## 2017-07-27 DIAGNOSIS — M79.603 PAIN OF UPPER EXTREMITY, UNSPECIFIED LATERALITY: ICD-10-CM

## 2017-07-27 NOTE — TELEPHONE ENCOUNTER
Controlled Substance Refill Request for tramadol  Problem List Complete:  No     PROVIDER TO CONSIDER COMPLETION OF PROBLEM LIST AND OVERVIEW/CONTROLLED SUBSTANCE AGREEMENT    Last Written Prescription Date:  5/31/17  Last Fill Quantity: 60,   # refills: 0    Last Office Visit with G primary care provider: 3/15/17    Future Office visit:   Next 5 appointments (look out 90 days)     Sep 18, 2017 10:30 AM CDT   SHORT with Mari Dunaway MD   [facility] ([facility])    [address] [place] [address] [phone]                  Controlled substance agreement on file: No.     Processing:  Fax Rx to [facility] pharmacy     checked in past 6 months?  No, route to RN

## 2017-07-28 RX ORDER — TRAMADOL HYDROCHLORIDE 50 MG/1
TABLET ORAL
Qty: 60 TABLET | Refills: 0 | Status: SHIPPED | OUTPATIENT
Start: 2017-07-28 | End: 2017-09-18

## 2017-07-28 NOTE — TELEPHONE ENCOUNTER
is current.  No CSA on file.  Not PSO med.  Sent to provider.  Melissa Henry RN    Patient is followed by Mari Galeano MD for ongoing prescription of pain medication.  All refills should be approved by this provider, or covering partner.    Medication(s): tramadol.   Maximum quantity per month: 60  Clinic visit frequency required: Q 6 months     Controlled substance agreement on file: No    Pain Clinic evaluation in the past: No    DIRE Total Score(s):  No flowsheet data found.    Last [facility] website verification: 02/24/2017   [email or web address]

## 2017-08-08 DIAGNOSIS — E78.00 PURE HYPERCHOLESTEROLEMIA: ICD-10-CM

## 2017-08-08 NOTE — LETTER
[facility]  [address] [place], [address]  [phone]        August 10, 2017    [address]  [facility] [place] [address]            We recently received a call from your pharmacy requesting a refill of Atorvastatin.  You will be due for fasting labs when you come for your 9/18/17 visit.  I see you are scheduled at 10:30 am.  If you can fast til then that is great or you can reschedule and get a earlier appointment when available or let us know if you want to come before your appointment on the 18th for a lab only and we can put a lab order in.     Taking care of your health is important to us and ongoing visits with your provider are vital to your care.  We look forward to seeing you in the near future.     Sincerely,     [facility]

## 2017-08-08 NOTE — TELEPHONE ENCOUNTER
atorvastatin 20 mg tablets        Last Written Prescription Date: 9/21/16  Last Fill Quantity: 30, # refills: 10  Last Office Visit with [facility], [facility] or [facility] prescribing provider: 3/15/17    Next 5 appointments (look out 90 days)     Sep 18, 2017 10:30 AM CDT   SHORT with Mari Dunaway MD   [facility]    [address] [place] [id number] [phone]        Lab Results   Component Value Date    CHOL 129 09/14/2016     Lab Results   Component Value Date    HDL 56 09/14/2016     Lab Results   Component Value Date    LDL 57 09/14/2016     Lab Results   Component Value Date    TRIG 79 09/14/2016     Lab Results   Component Value Date    CHOLHDLRATIO 2.2 08/01/2015

## 2017-08-10 RX ORDER — ATORVASTATIN CALCIUM 20 MG/1
20 TABLET, FILM COATED ORAL DAILY
Qty: 30 TABLET | Refills: 1 | Status: SHIPPED | OUTPATIENT
Start: 2017-08-10 | End: 2017-10-06

## 2017-08-10 NOTE — TELEPHONE ENCOUNTER
Visit due 9/15/17.  Fasting labs will be due this time.  Letter sent.  Has appt 9/18/17 10:30 am.  Advised to come fasting, reschedule to earlier or let us know if wants to do lab only prior to appt.  Melissa Henry RN

## 2017-08-25 ENCOUNTER — CARE COORDINATION (OUTPATIENT)
Dept: GERIATRIC MEDICINE | Facility: CLINIC | Age: 82
End: 2017-08-25

## 2017-08-25 NOTE — PROGRESS NOTES
8/24/17 Rec'd vm from client's niece Nikia stating that she has had great difficulty in setting up the order with [employer] and inquired if CM can be of assistance.   [phone]  8/25/17 Left vm with Nikia requesting a return call, stating that CM would initiate the standing orders, question what type of difficulty she had.  CM will need MM ID, days of the week she attends ADC, p/u and end times.   Anna Ely RN, BC  Supervisor [facility]   [phone] (Fax)

## 2017-08-25 NOTE — PROGRESS NOTES
Rec'd tele call from Nikia she stated that she e-mailed the Standing orders form and called a couple times weekly and then was told that she should have faxed the form. Nikia e-mailed the completed MM Standing Orders form, CM Right faxed to MM, confirmed delivery  Per policy, eff standing orders will likely be 10/2/17. CM will f/u weekly  Arranged for annual HRA for 9/22/2017 @ 2 PM.  Anna Ely RN, BC  Supervisor [facility]   [phone] (Fax)

## 2017-08-29 DIAGNOSIS — E87.6 HYPOKALEMIA: ICD-10-CM

## 2017-08-29 NOTE — TELEPHONE ENCOUNTER
Pending Prescriptions:                       Disp   Refills    potassium chloride SA (K-DUR/KLOR-CON M) *120 ta*0            Sig: TAKE 2 TABLETS BY MOUTH TWICE DAILY              Last Written Prescription Date: 1/4/2017  Last Fill Quantity: 120, # refills: 7  Last Office Visit with [facility], [facility] or [facility] prescribing provider: 3/15/2017Gume    Next 5 appointments (look out 90 days)     Sep 18, 2017 10:30 AM CDT   SHORT with Mari Dunaway MD   [facility]    [address] [place] [address] [phone]                   Potassium   Date Value Ref Range Status   03/15/2017 5.0 3.4 - 5.3 mmol/L Final     Creatinine   Date Value Ref Range Status   03/15/2017 0.78 0.52 - 1.04 mg/dL Final     BP Readings from Last 3 Encounters:   05/09/17 120/80   05/06/17 98/63   03/15/17 118/70

## 2017-08-30 RX ORDER — POTASSIUM CHLORIDE 1500 MG/1
TABLET, EXTENDED RELEASE ORAL
Qty: 120 TABLET | Refills: 1 | Status: SHIPPED | OUTPATIENT
Start: 2017-08-30 | End: 2017-10-22

## 2017-08-30 NOTE — TELEPHONE ENCOUNTER
Sent to provider.  Drug to drug interaction came up-Aldactone and K+.  States giving K+ and K+ sparing diuretic may cause hypercalcemia leading to cardiac arrhythmia or cardiac arrest.      Melissa Henry RN

## 2017-08-30 NOTE — PROGRESS NOTES
Call placed to [employer] Customer Service to f/u on Standing Orders. CM was informed that the fax was received and will be processed on 9/15/17, [employer] will f/u with client first week in October  Call placed to client's katharine Montejo to inform, explained that CM will not contact [employer] for f/u unless client does not hear back from [employer] in early October.  Anna Ely RN, BC  Supervisor [facility]   [phone] (Fax)

## 2017-08-31 DIAGNOSIS — I10 HTN, GOAL BELOW 140/90: ICD-10-CM

## 2017-08-31 RX ORDER — LOSARTAN POTASSIUM 100 MG/1
TABLET ORAL
Qty: 30 TABLET | Refills: 4 | Status: SHIPPED | OUTPATIENT
Start: 2017-08-31 | End: 2017-09-25

## 2017-08-31 NOTE — TELEPHONE ENCOUNTER
Sent to provider due to below interaction warning.      Drug-Drug Interaction Report    Potassium-Sparing Diuretics / Angiotensin II Receptor Antagonists    Significance: Major     Warning: The risk of hyperkalemia may be increased when potassium-sparing diuretics are co-administered with angiotensin II receptor antagonists.      Melissa Henry RN

## 2017-08-31 NOTE — TELEPHONE ENCOUNTER
losartan (COZAAR) 100 MG tablet      Last Written Prescription Date:  11/18/16  Last Fill Quantity: 30, # refills: 9  Last Office Visit with G, [facility] or [facility] prescribing provider: ruba 3/15/17      Next 5 appointments (look out 90 days)     Sep 18, 2017 10:30 AM CDT   SHORT with Mari Dunaway MD   [facility]    [address] [place] [address] [phone]                   Potassium   Date Value Ref Range Status   03/15/2017 5.0 3.4 - 5.3 mmol/L Final     Creatinine   Date Value Ref Range Status   03/15/2017 0.78 0.52 - 1.04 mg/dL Final     BP Readings from Last 3 Encounters:   05/09/17 120/80   05/06/17 98/63   03/15/17 118/70

## 2017-09-12 DIAGNOSIS — E87.6 HYPOKALEMIA: ICD-10-CM

## 2017-09-13 NOTE — TELEPHONE ENCOUNTER
spironolactone (ALDACTONE) 25 MG tablet    Last Written Prescription Date: 03/22/2017  Last Fill Quantity: 90,06/20/2017 # refills: 1  Last Office Visit with G, P or [facility] prescribing provider: 03/15/2017-Dr Mari Dunaway  Next 5 appointments (look out 90 days)     Sep 18, 2017 10:30 AM CDT   SHORT with Mari Dunaway MD   [facility]    [address] [place] [address] [phone]                   Potassium   Date Value Ref Range Status   03/15/2017 5.0 3.4 - 5.3 mmol/L Final     Creatinine   Date Value Ref Range Status   03/15/2017 0.78 0.52 - 1.04 mg/dL Final     BP Readings from Last 3 Encounters:   05/09/17 120/80   05/06/17 98/63   03/15/17 118/70

## 2017-09-14 RX ORDER — SPIRONOLACTONE 25 MG/1
TABLET ORAL
Qty: 90 TABLET | Refills: 0 | Status: SHIPPED | OUTPATIENT
Start: 2017-09-14 | End: 2017-09-25

## 2017-09-18 DIAGNOSIS — M79.603 PAIN OF UPPER EXTREMITY, UNSPECIFIED LATERALITY: ICD-10-CM

## 2017-09-18 DIAGNOSIS — G89.4 CHRONIC PAIN SYNDROME: ICD-10-CM

## 2017-09-19 NOTE — TELEPHONE ENCOUNTER
Controlled Substance Refill Request for traMADol (ULTRAM) 50 MG tablet    Problem List Complete:  No     PROVIDER TO CONSIDER COMPLETION OF PROBLEM LIST AND OVERVIEW/CONTROLLED SUBSTANCE AGREEMENT    Last Written Prescription Date:  07/28/2017  Last Fill Quantity: 60,07/28/2017   # refills: 0    Last Office Visit with [facility] primary care provider: 03/15/2017-Dr Mari Dunaway    Future Office visit:   Next 5 appointments (look out 90 days)     Sep 25, 2017 10:30 AM CDT   SHORT with Mari Dunaway MD   [facility]    [address] [place] [id number] [phone]                  Controlled substance agreement on file: NO     Processing:  [facility]     checked in past 6 months?  No, route to RN

## 2017-09-20 RX ORDER — TRAMADOL HYDROCHLORIDE 50 MG/1
TABLET ORAL
Qty: 60 TABLET | Refills: 0 | Status: SHIPPED | OUTPATIENT
Start: 2017-09-20 | End: 2017-11-20

## 2017-09-20 NOTE — TELEPHONE ENCOUNTER
Routing refill request to provider for review/approval because:  Drug not on the [facility] refill protocol   : 9.20.17 no problems noted    Isabela Potter RN, BS  Clinical Nurse Triage.

## 2017-09-21 NOTE — TELEPHONE ENCOUNTER
Faxed prescription for Ultram 50 Mg to [facility] [employer] [facility] [place], [place] fax # [phone].    Jennifer Gutierres/

## 2017-09-22 ENCOUNTER — CARE COORDINATION (OUTPATIENT)
Dept: GERIATRIC MEDICINE | Facility: CLINIC | Age: 82
End: 2017-09-22

## 2017-09-22 NOTE — PROGRESS NOTES
Annual HRA rescheduled for 10/5/17 @ 3 PM  Anna Ely RN, BC  Supervisor [facility]   [phone] (Fax)

## 2017-09-25 ENCOUNTER — OFFICE VISIT (OUTPATIENT)
Dept: FAMILY MEDICINE | Facility: CLINIC | Age: 82
End: 2017-09-25
Payer: COMMERCIAL

## 2017-09-25 ENCOUNTER — CARE COORDINATION (OUTPATIENT)
Dept: GERIATRIC MEDICINE | Facility: CLINIC | Age: 82
End: 2017-09-25

## 2017-09-25 VITALS
DIASTOLIC BLOOD PRESSURE: 83 MMHG | BODY MASS INDEX: 36.85 KG/M2 | HEART RATE: 86 BPM | RESPIRATION RATE: 18 BRPM | WEIGHT: 187.7 LBS | TEMPERATURE: 97.4 F | SYSTOLIC BLOOD PRESSURE: 136 MMHG | HEIGHT: 60 IN

## 2017-09-25 DIAGNOSIS — E11.42 DIABETIC POLYNEUROPATHY ASSOCIATED WITH TYPE 2 DIABETES MELLITUS (H): ICD-10-CM

## 2017-09-25 DIAGNOSIS — E78.5 HYPERLIPIDEMIA LDL GOAL <100: ICD-10-CM

## 2017-09-25 DIAGNOSIS — I48.20 CHRONIC ATRIAL FIBRILLATION (H): ICD-10-CM

## 2017-09-25 DIAGNOSIS — M79.603 PAIN OF UPPER EXTREMITY, UNSPECIFIED LATERALITY: ICD-10-CM

## 2017-09-25 DIAGNOSIS — E87.6 HYPOKALEMIA: ICD-10-CM

## 2017-09-25 DIAGNOSIS — I10 ESSENTIAL HYPERTENSION, BENIGN: ICD-10-CM

## 2017-09-25 DIAGNOSIS — J44.9 CHRONIC OBSTRUCTIVE PULMONARY DISEASE, UNSPECIFIED COPD TYPE (H): ICD-10-CM

## 2017-09-25 DIAGNOSIS — Z23 NEED FOR PROPHYLACTIC VACCINATION AND INOCULATION AGAINST INFLUENZA: ICD-10-CM

## 2017-09-25 DIAGNOSIS — I10 HTN, GOAL BELOW 140/90: Primary | ICD-10-CM

## 2017-09-25 DIAGNOSIS — E11.59 TYPE 2 DIABETES MELLITUS WITH OTHER CIRCULATORY COMPLICATIONS (H): ICD-10-CM

## 2017-09-25 DIAGNOSIS — I50.9 CONGESTIVE HEART FAILURE, UNSPECIFIED CONGESTIVE HEART FAILURE CHRONICITY, UNSPECIFIED CONGESTIVE HEART FAILURE TYPE: ICD-10-CM

## 2017-09-25 DIAGNOSIS — G89.4 CHRONIC PAIN SYNDROME: ICD-10-CM

## 2017-09-25 LAB
ERYTHROCYTE [DISTWIDTH] IN BLOOD BY AUTOMATED COUNT: 14.6 % (ref 10–15)
HBA1C MFR BLD: 5.6 % (ref 4.3–6)
HCT VFR BLD AUTO: 50.1 % (ref 35–47)
HGB BLD-MCNC: 16.5 G/DL (ref 11.7–15.7)
MCH RBC QN AUTO: 31.4 PG (ref 26.5–33)
MCHC RBC AUTO-ENTMCNC: 32.9 G/DL (ref 31.5–36.5)
MCV RBC AUTO: 95 FL (ref 78–100)
PLATELET # BLD AUTO: 254 10E9/L (ref 150–450)
RBC # BLD AUTO: 5.26 10E12/L (ref 3.8–5.2)
WBC # BLD AUTO: 6.1 10E9/L (ref 4–11)

## 2017-09-25 PROCEDURE — 80061 LIPID PANEL: CPT | Performed by: FAMILY MEDICINE

## 2017-09-25 PROCEDURE — 99207 C FOOT EXAM  NO CHARGE: CPT | Performed by: FAMILY MEDICINE

## 2017-09-25 PROCEDURE — 85027 COMPLETE CBC AUTOMATED: CPT | Performed by: FAMILY MEDICINE

## 2017-09-25 PROCEDURE — 83036 HEMOGLOBIN GLYCOSYLATED A1C: CPT | Performed by: FAMILY MEDICINE

## 2017-09-25 PROCEDURE — 99214 OFFICE O/P EST MOD 30 MIN: CPT | Mod: 25 | Performed by: FAMILY MEDICINE

## 2017-09-25 PROCEDURE — 80053 COMPREHEN METABOLIC PANEL: CPT | Performed by: FAMILY MEDICINE

## 2017-09-25 PROCEDURE — 90471 IMMUNIZATION ADMIN: CPT | Performed by: FAMILY MEDICINE

## 2017-09-25 PROCEDURE — 82043 UR ALBUMIN QUANTITATIVE: CPT | Performed by: FAMILY MEDICINE

## 2017-09-25 PROCEDURE — 36415 COLL VENOUS BLD VENIPUNCTURE: CPT | Performed by: FAMILY MEDICINE

## 2017-09-25 PROCEDURE — 90662 IIV NO PRSV INCREASED AG IM: CPT | Performed by: FAMILY MEDICINE

## 2017-09-25 RX ORDER — TRAMADOL HYDROCHLORIDE 50 MG/1
TABLET ORAL
Qty: 60 TABLET | Refills: 0
Start: 2017-09-25

## 2017-09-25 RX ORDER — FUROSEMIDE 20 MG
TABLET ORAL
Qty: 180 TABLET | Refills: 1 | Status: SHIPPED | OUTPATIENT
Start: 2017-09-25 | End: 2017-12-11

## 2017-09-25 RX ORDER — CARVEDILOL 25 MG/1
25 TABLET ORAL 2 TIMES DAILY WITH MEALS
Qty: 180 TABLET | Refills: 3 | Status: SHIPPED | OUTPATIENT
Start: 2017-09-25 | End: 2017-12-11

## 2017-09-25 RX ORDER — SPIRONOLACTONE 25 MG/1
25 TABLET ORAL DAILY
Qty: 90 TABLET | Refills: 3 | Status: SHIPPED | OUTPATIENT
Start: 2017-09-25

## 2017-09-25 RX ORDER — LOSARTAN POTASSIUM 100 MG/1
TABLET ORAL
Qty: 90 TABLET | Refills: 3 | Status: SHIPPED | OUTPATIENT
Start: 2017-09-25

## 2017-09-25 ASSESSMENT — ANXIETY QUESTIONNAIRES
GAD7 TOTAL SCORE: 0
6. BECOMING EASILY ANNOYED OR IRRITABLE: NOT AT ALL
2. NOT BEING ABLE TO STOP OR CONTROL WORRYING: NOT AT ALL
3. WORRYING TOO MUCH ABOUT DIFFERENT THINGS: NOT AT ALL
7. FEELING AFRAID AS IF SOMETHING AWFUL MIGHT HAPPEN: NOT AT ALL
5. BEING SO RESTLESS THAT IT IS HARD TO SIT STILL: NOT AT ALL
IF YOU CHECKED OFF ANY PROBLEMS ON THIS QUESTIONNAIRE, HOW DIFFICULT HAVE THESE PROBLEMS MADE IT FOR YOU TO DO YOUR WORK, TAKE CARE OF THINGS AT HOME, OR GET ALONG WITH OTHER PEOPLE: NOT DIFFICULT AT ALL
1. FEELING NERVOUS, ANXIOUS, OR ON EDGE: NOT AT ALL

## 2017-09-25 ASSESSMENT — PATIENT HEALTH QUESTIONNAIRE - PHQ9
SUM OF ALL RESPONSES TO PHQ QUESTIONS 1-9: 0
5. POOR APPETITE OR OVEREATING: NOT AT ALL

## 2017-09-25 NOTE — MR AVS SNAPSHOT
"              After Visit Summary   9/25/2017    Nataliia Scanlon    MRN: [id number]           Patient Information     Date Of Birth          1935        Visit Information        Provider Department      9/25/2017 10:30 AM Mari Dunaway MD [facility]        Today's Diagnoses     HTN, goal below 140/90    -  1    Chronic obstructive pulmonary disease, unspecified COPD type (H)        Hyperlipidemia LDL goal <100        Type 2 diabetes mellitus with other circulatory complications (H)        Chronic atrial fibrillation (H)        Chronic pain syndrome           Follow-ups after your visit        Who to contact     If you have questions or need follow up information about today's clinic visit or your schedule please contact [facility] directly at [phone].  Normal or non-critical lab and imaging results will be communicated to you by [employer], letter or phone within 4 business days after the clinic has received the results. If you do not hear from us within 7 days, please contact the clinic through [employer] or phone. If you have a critical or abnormal lab result, we will notify you by phone as soon as possible.  Submit refill requests through [employer] or call your pharmacy and they will forward the refill request to us. Please allow 3 business days for your refill to be completed.          Additional Information About Your Visit        MyChart Information     [employer] lets you send messages to your doctor, view your test results, renew your prescriptions, schedule appointments and more. To sign up, go to [email or web address] . Click on \"Log in\" on the left side of the screen, which will take you to the Welcome page. Then click on \"Sign up Now\" on the right side of the page.     You will be asked to enter the access code listed below, as well as some personal information. Please follow the directions to create your username and password.     Your access code is: " [email or web address]  Expires: 2017 10:56 AM     Your access code will  in 90 days. If you need help or a new code, please call your [place] clinic or [phone].        Care EveryWhere ID     This is your Care EveryWhere ID. This could be used by other organizations to access your [place] medical records  [id number]        Your Vitals Were     Pulse Temperature Respirations Height BMI (Body Mass Index)       86 97.4  F (36.3  C) (Oral) 18 5' (1.524 m) 36.66 kg/m2        Blood Pressure from Last 3 Encounters:   17 136/83   17 120/80   17 98/63    Weight from Last 3 Encounters:   17 187 lb 11.2 oz (85.1 kg)   17 192 lb (87.1 kg)   03/15/17 188 lb 9.6 oz (85.5 kg)              We Performed the Following     Albumin Random Urine Quantitative with Creat Ratio     CBC with platelets     Comprehensive metabolic panel     FOOT EXAM     Hemoglobin A1c     Lipid panel reflex to direct LDL        Primary Care Provider Office Phone # Fax #    Mari Dunaway [phone]  [phone]        Equal Access to Services     EDDA FULTON AH: Hadii aad ku hadasho Soomaali, waaxda luqadaha, qaybta kaalmada adeegyada, waxay idiin hayzin janeen castano la'brent ah. So [facility] [place] [phone].    ATENCIÓN: Si habla español, tiene a maddox disposición servicios gratuitos de asistencia lingüística. Llame al [phone].    We comply with applicable federal civil rights laws and [place] laws. We do not discriminate on the basis of race, color, national origin, age, disability sex, sexual orientation or gender identity.            Thank you!     Thank you for choosing [facility]  for your care. Our goal is always to provide you with excellent care. Hearing back from our patients is one way we can continue to improve our services. Please take a few minutes to complete the written survey that you may receive in the mail after your visit with us. Thank  you!             Your Updated Medication List - Protect others around you: Learn how to safely use, store and throw away your medicines at www.disposemymeds.org.          This list is accurate as of: 9/25/17 10:56 AM.  Always use your most recent med list.                   Brand Name Dispense Instructions for use Diagnosis    ADVAIR DISKUS 250-50 MCG/DOSE diskus inhaler   Generic drug:  fluticasone-salmeterol     3 Inhaler    INHALE 1 PUFF INTO THE LUNGS TWICE DAILY    Chronic obstructive pulmonary disease, unspecified COPD type (H)       * albuterol 108 (90 BASE) MCG/ACT Inhaler    PROAIR HFA/PROVENTIL HFA/VENTOLIN HFA    1 Inhaler    Inhale 2 puffs into the lungs every 6 hours as needed for shortness of breath / dyspnea or wheezing    COPD exacerbation (H)       * albuterol (2.5 MG/3ML) 0.083% neb solution     30 vial    Take 1 vial (2.5 mg) by nebulization every 6 hours as needed for shortness of breath / dyspnea or wheezing    COPD exacerbation (H)       ascorbic acid 500 MG tablet    VITAMIN C     Take 500 mg by mouth daily        aspirin 81 MG tablet     100 tablet    Take 1 tablet (81 mg) by mouth daily    Type 2 diabetes mellitus with other circulatory complications (H)       atorvastatin 20 MG tablet    LIPITOR    30 tablet    Take 1 tablet (20 mg) by mouth daily    Pure hypercholesterolemia       CALCIUM 500+D HIGH POTENCY 500-400 MG-UNIT Tabs per tablet   Generic drug:  calcium carbonate-vitamin D     270 tablet    TAKE 1 TABLET BY MOUTH THREE TIMES DAILY    Osteopenia       carvedilol 25 MG tablet    COREG    180 tablet    Take 1 tablet (25 mg) by mouth 2 times daily (with meals)    Essential hypertension, benign       COMPOUNDED NON-CONTROLLED SUBSTANCE - PHARMACY TO MIX COMPOUNDED MEDICATION    CMPD RX    120 g    Apply 1-2g to the feet 3-4 times a day as needed for pain.    Pain in both feet, Diabetic polyneuropathy associated with type 2 diabetes mellitus (H), Pes planus of both feet, Venous  insufficiency of both lower extremities, Primary localized osteoarthrosis, ankle and foot, right, Primary localized osteoarthrosis, ankle and foot, left       donepezil 5 MG tablet    ARIcept     Take 1 tablet by mouth daily Reported on 5/9/2017        fish oil-omega-3 fatty acids 1000 MG capsule      Take 1 g by mouth daily        fluticasone 50 MCG/ACT spray    FLONASE    1 Bottle    Spray 1-2 sprays into both nostrils daily    Chronic rhinitis       furosemide 20 MG tablet    LASIX    60 tablet    TAKE 1 TABLET(20 MG) BY MOUTH TWICE DAILY    CHF (congestive heart failure) (H)       hydrALAZINE 25 MG tablet    APRESOLINE    180 tablet    TAKE 2 TABLETS(50 MG) BY MOUTH DAILY    Essential hypertension, benign       ipratropium - albuterol 0.5 mg/2.5 mg/3 mL 0.5-2.5 (3) MG/3ML neb solution    DUONEB    3 mL    Take 1 vial (3 mLs) by nebulization once for 1 dose    Chest congestion       JOBST RELIEF KNEE HIGH/MEDIUM Misc     1 each    1 each daily    Bilateral edema of lower extremity       losartan 100 MG tablet    COZAAR    30 tablet    TAKE 1 TABLET(100 MG) BY MOUTH DAILY    HTN, goal below 140/90       Magnesium 400 MG Caps      Take 1 tablet by mouth daily        * order for DME     1 Units    Equipment being ordered: Nebulizer    COPD exacerbation (H)       * order for DME     1 each    Equipment being ordered: diabetic shoe inserts    Type 2 diabetes mellitus with other circulatory complications (H)       * order for DME     1 each    Equipment being ordered: diabetic shoes    Type 2 diabetes mellitus with other circulatory complications (H)       * order for DME     1 Device    Device to put on compression socks    Pain in both feet, Diabetic polyneuropathy associated with type 2 diabetes mellitus (H), Pes planus of both feet, Venous insufficiency of both lower extremities, Primary localized osteoarthrosis, ankle and foot, right, Primary localized osteoarthrosis, ankle and foot, left       potassium chloride  SA 20 MEQ CR tablet    K-DUR/KLOR-CON M    120 tablet    TAKE 2 TABLETS BY MOUTH TWICE DAILY    Hypokalemia       spironolactone 25 MG tablet    ALDACTONE    90 tablet    TAKE ONE TABLET BY MOUTH EVERY DAY    Hypokalemia       traMADol 50 MG tablet    ULTRAM    60 tablet    TAKE ONE TABLET BY MOUTH EVERY 6 HOURS AS NEEDED FOR MODERATE PAIN    Pain of upper extremity, unspecified laterality       * Notice:  This list has 6 medication(s) that are the same as other medications prescribed for you. Read the directions carefully, and ask your doctor or other care provider to review them with you.

## 2017-09-25 NOTE — NURSING NOTE
Chief Complaint   Patient presents with     Diabetes     recheck        Initial /83 (BP Location: Left arm, Patient Position: Chair, Cuff Size: Adult Large)  Pulse 86  Temp 97.4  F (36.3  C) (Oral)  Resp 18  Ht 5' (1.524 m)  Wt 187 lb 11.2 oz (85.1 kg)  BMI 36.66 kg/m2 Estimated body mass index is 36.66 kg/(m^2) as calculated from the following:    Height as of this encounter: 5' (1.524 m).    Weight as of this encounter: 187 lb 11.2 oz (85.1 kg).  Medication Reconciliation: complete     Ruchi Aly, CMA

## 2017-09-25 NOTE — PROGRESS NOTES
Injectable Influenza Immunization Documentation    1.  Is the person to be vaccinated sick today?   No    2. Does the person to be vaccinated have an allergy to a component   of the vaccine?   No    3. Has the person to be vaccinated ever had a serious reaction   to influenza vaccine in the past?   No    4. Has the person to be vaccinated ever had Guillain-Barré syndrome?   No    Form completed by Carey Guillory

## 2017-09-25 NOTE — PROGRESS NOTES
SUBJECTIVE:   Nataliia Scanlon is a 82 year old female who presents to clinic today for the following health issues:    Recheck on meds.   She is doing well with no side effects.   She mentions right wrist pain off and on.   She takes tramadol off and on but not very often.   She also uses tylenol at times when not using tramadol.   It works ok.   She also has pain in her feet.   She does not wish to take any more medication for tx of this.       She does need a wheelchair at home due to her neuropathy and limited mobility.  She will need some forms filled out for this.        Diabetes Follow-up      Patient is checking blood sugars: not at all    Diabetic concerns: None     Symptoms of hypoglycemia (low blood sugar): none     Paresthesias (numbness or burning in feet) or sores: No     Date of last diabetic eye exam: unknown    Hyperlipidemia Follow-Up      Rate your low fat/cholesterol diet?: good    Taking statin?  Yes, no muscle aches from statin    Other lipid medications/supplements?:  Fish Oil    Hypertension Follow-up      Outpatient blood pressures are being checked at home.  Results are WNL.    Low Salt Diet: no added salt        Amount of exercise or physical activity: 2-3 days/week     Problems taking medications regularly: No    Medication side effects: none  Diet: regular (no restrictions)    Past Medical History:   Diagnosis Date     Aortic sclerosis 2006    check echo every 2 years - last one 11/2013     Atrial fibrillation (H) 2014    Dr. Lamar and she decided NO coumadin 1/2015     Baker's cyst of knee 4/2008    left - small     CHF (congestive heart failure) 12/3/2014     Chronic airway obstruction, not elsewhere classified 2002    moderately severe     Diabetes type 2, controlled (H) 2011     HTN, goal below 140/90 5/21/2003     Hyperlipidaemia      Maxillary fracture (H) 5-14     Mitral regurgitation     1-2+     NONSPECIFIC MEDICAL HISTORY 2006    pt is DNI but not DNR - see living will       Osteoarthritis 4/2009    and bilateral knee steroid injection     Syncope      Tricuspid regurgitation     2-3+     Unspecified essential hypertension      Venous insufficiency 5/19/2010       Past Surgical History:   Procedure Laterality Date     C NONSPECIFIC PROCEDURE      hosp x 1 for HTN       MEDICATIONS:  Current Outpatient Prescriptions   Medication     spironolactone (ALDACTONE) 25 MG tablet     losartan (COZAAR) 100 MG tablet     furosemide (LASIX) 20 MG tablet     carvedilol (COREG) 25 MG tablet     traMADol (ULTRAM) 50 MG tablet     potassium chloride SA (K-DUR/KLOR-CON M) 20 MEQ CR tablet     atorvastatin (LIPITOR) 20 MG tablet     hydrALAZINE (APRESOLINE) 25 MG tablet     CALCIUM 500+D HIGH POTENCY 500-400 MG-UNIT TABS per tablet     order for DME     COMPOUND (CMPD RX) - PHARMACY TO MIX COMPOUNDED MEDICATION     ADVAIR DISKUS 250-50 MCG/DOSE diskus inhaler     fluticasone (FLONASE) 50 MCG/ACT spray     aspirin 81 MG tablet     donepezil (ARICEPT) 5 MG tablet     fish oil-omega-3 fatty acids 1000 MG capsule     Magnesium 400 MG CAPS     order for DME     order for DME     Elastic Bandages & Supports (JOBST RELIEF KNEE HIGH/MEDIUM) MISC     albuterol (2.5 MG/3ML) 0.083% nebulizer solution     albuterol (PROAIR HFA, PROVENTIL HFA, VENTOLIN HFA) 108 (90 BASE) MCG/ACT inhaler     order for DME     ascorbic acid (VITAMIN C) 500 MG tablet     [DISCONTINUED] spironolactone (ALDACTONE) 25 MG tablet     [DISCONTINUED] losartan (COZAAR) 100 MG tablet     [DISCONTINUED] furosemide (LASIX) 20 MG tablet     [DISCONTINUED] carvedilol (COREG) 25 MG tablet     ipratropium - albuterol 0.5 mg/2.5 mg/3 mL (DUONEB) 0.5-2.5 (3) MG/3ML nebulization     No current facility-administered medications for this visit.        SOCIAL HISTORY:  Social History   Substance Use Topics     Smoking status: Never Smoker     Smokeless tobacco: Never Used     Alcohol use No       Family History   Problem Relation Age of Onset      Hypertension Mother      Hypertension Sister        Objective:  Blood pressure 136/83, pulse 86, temperature 97.4  F (36.3  C), temperature source Oral, resp. rate 18, height 5' (1.524 m), weight 187 lb 11.2 oz (85.1 kg), not currently breastfeeding.  Neck:  There is no lymphadenopathy or thyroid tenderness or enlargement  Chest: Clear to auscultation bilaterally.  No wheezes, rales or retractions.  CV:   Regular rate and a fib rhythm.   1/6 systolic murmur - not as loud as it used to be  Ext: 2+ ankle edema but not pitting  Monofilament is spotty at best    Assessment:  1. Diabetes - under good control  2. Lipids - under good control  3. blood pressure - .under good control  4. A fib - stable - rate is good  5. Hx of low K - has been very good for a long time  6. Hx of CHF - stable for quite some time  7. Likely neuropathy in feet - patient does not wish additional tx.   This is not reversible.   She would benefit from a standard wheelchair at home to help her get around due to instability on her feet and deconditioning due to age and health conditions.      Plan:  1. Refills per [facility]  2. Continue meds at same doses for now  3. Flu shot  4. See [facility] orders for labs  5. Recheck in 6 months  6. Get copy of eye exam from last summer

## 2017-09-25 NOTE — TELEPHONE ENCOUNTER
Controlled Substance Refill Request for traMADol (ULTRAM) 50 MG tablet    Problem List Complete:  No     PROVIDER TO CONSIDER COMPLETION OF PROBLEM LIST AND OVERVIEW/CONTROLLED SUBSTANCE AGREEMENT    Last Written Prescription Date:  09/2/2017  Last Fill Quantity: 60,   # refills: 0    Last Office Visit with [facility] primary care provider: 09/25/2017-Dr Mari Dunaway    Future Office visit:     Controlled substance agreement on file: No.     Processing:  Fax Rx to [facility] pharmacy     checked in past 6 months?  No, route to RN

## 2017-09-25 NOTE — PROGRESS NOTES
Call placed to [employer] Customer [employer] for Standing Orders. Left vm requesting a return call to CM or client to inquire on Standing Orders approval. Initial request faxed on 8/25/17, per family they have not rec'd any communication from [employer].  Anna Ely RN, BC  Supervisor [facility]   [phone] (Fax)

## 2017-09-25 NOTE — LETTER
September 27, 2017      Nataliia Scanlon  [address] [place] [address]        Dear ,    Labs are very good/stable    Resulted Orders   Albumin Random Urine Quantitative with Creat Ratio   Result Value Ref Range    Creatinine Urine 84 mg/dL    Albumin Urine mg/L 14 mg/L    Albumin Urine mg/g Cr 16.61 0 - 25 mg/g Cr   Hemoglobin A1c   Result Value Ref Range    Hemoglobin A1C 5.6 4.3 - 6.0 %   Comprehensive metabolic panel   Result Value Ref Range    Sodium 136 133 - 144 mmol/L    Potassium 5.1 3.4 - 5.3 mmol/L    Chloride 100 94 - 109 mmol/L    Carbon Dioxide 27 20 - 32 mmol/L    Anion Gap 9 3 - 14 mmol/L    Glucose 104 (H) 70 - 99 mg/dL    Urea Nitrogen 21 7 - 30 mg/dL    Creatinine 0.89 0.52 - 1.04 mg/dL    GFR Estimate 61 >60 mL/min/1.7m2      Comment:      Non  GFR Calc    GFR Estimate If Black 73 >60 mL/min/1.7m2      Comment:       GFR Calc    Calcium 9.7 8.5 - 10.1 mg/dL    Bilirubin Total 0.9 0.2 - 1.3 mg/dL    Albumin 3.5 3.4 - 5.0 g/dL    Protein Total 7.4 6.8 - 8.8 g/dL    Alkaline Phosphatase 143 40 - 150 U/L    ALT 31 0 - 50 U/L    AST 20 0 - 45 U/L   Lipid panel reflex to direct LDL   Result Value Ref Range    Cholesterol 108 <200 mg/dL    Triglycerides 94 <150 mg/dL    HDL Cholesterol 52 >49 mg/dL    LDL Cholesterol Calculated 37 <100 mg/dL      Comment:      Desirable:       <100 mg/dl    Non HDL Cholesterol 56 <130 mg/dL   CBC with platelets   Result Value Ref Range    WBC 6.1 4.0 - 11.0 10e9/L    RBC Count 5.26 (H) 3.8 - 5.2 10e12/L      Comment:      Reviewed: OK with previous  Results confirmed by repeat test      Hemoglobin 16.5 (H) 11.7 - 15.7 g/dL      Comment:      Reviewed: OK with previous  Results confirmed by repeat test      Hematocrit 50.1 (H) 35.0 - 47.0 %      Comment:      Reviewed: OK with previous  Results confirmed by repeat test      MCV 95 78 - 100 fl    MCH 31.4 26.5 - 33.0 pg    MCHC 32.9 31.5 - 36.5 g/dL    RDW 14.6 10.0 -  15.0 %    Platelet Count 254 150 - 450 10e9/L       If you have any questions or concerns, please call the clinic at the number listed above.       Sincerely,        Mari Dunaway MD

## 2017-09-26 ENCOUNTER — TRANSFERRED RECORDS (OUTPATIENT)
Dept: HEALTH INFORMATION MANAGEMENT | Facility: CLINIC | Age: 82
End: 2017-09-26

## 2017-09-26 LAB
ALBUMIN SERPL-MCNC: 3.5 G/DL (ref 3.4–5)
ALP SERPL-CCNC: 143 U/L (ref 40–150)
ALT SERPL W P-5'-P-CCNC: 31 U/L (ref 0–50)
ANION GAP SERPL CALCULATED.3IONS-SCNC: 9 MMOL/L (ref 3–14)
AST SERPL W P-5'-P-CCNC: 20 U/L (ref 0–45)
BILIRUB SERPL-MCNC: 0.9 MG/DL (ref 0.2–1.3)
BUN SERPL-MCNC: 21 MG/DL (ref 7–30)
CALCIUM SERPL-MCNC: 9.7 MG/DL (ref 8.5–10.1)
CHLORIDE SERPL-SCNC: 100 MMOL/L (ref 94–109)
CHOLEST SERPL-MCNC: 108 MG/DL
CO2 SERPL-SCNC: 27 MMOL/L (ref 20–32)
CREAT SERPL-MCNC: 0.89 MG/DL (ref 0.52–1.04)
CREAT UR-MCNC: 84 MG/DL
GFR SERPL CREATININE-BSD FRML MDRD: 61 ML/MIN/1.7M2
GLUCOSE SERPL-MCNC: 104 MG/DL (ref 70–99)
HDLC SERPL-MCNC: 52 MG/DL
LDLC SERPL CALC-MCNC: 37 MG/DL
MICROALBUMIN UR-MCNC: 14 MG/L
MICROALBUMIN/CREAT UR: 16.61 MG/G CR (ref 0–25)
NONHDLC SERPL-MCNC: 56 MG/DL
POTASSIUM SERPL-SCNC: 5.1 MMOL/L (ref 3.4–5.3)
PROT SERPL-MCNC: 7.4 G/DL (ref 6.8–8.8)
SODIUM SERPL-SCNC: 136 MMOL/L (ref 133–144)
TRIGL SERPL-MCNC: 94 MG/DL

## 2017-09-26 ASSESSMENT — ANXIETY QUESTIONNAIRES: GAD7 TOTAL SCORE: 0

## 2017-09-26 NOTE — TELEPHONE ENCOUNTER
Pt calls, Rx not at pharmacy. They told her refill denied.  ?  We called [facility] pharmacist. No fax. Called in.  Pt informed ready in 30 minutes.   Nirmal Gamboa RN

## 2017-09-26 NOTE — PROGRESS NOTES
Call placed to client's niece Nikia to share information below.  Explained that CM did request a f/u call to client's home or niece Nikia.  CM to follow.  Anna Ely RN, BC  Supervisor [facility]   [phone] (Fax)

## 2017-09-28 DIAGNOSIS — I50.9 CHF (CONGESTIVE HEART FAILURE) (H): ICD-10-CM

## 2017-09-28 NOTE — TELEPHONE ENCOUNTER
FUROSEMIDE 20MG TABLETS        Last Written Prescription Date: 09/25/17  Last Fill Quantity: 180, # refills: 1  Last Office Visit with [facility], P or [facility] prescribing provider: 09/25/17 Dr Dunaway       Potassium   Date Value Ref Range Status   09/25/2017 5.1 3.4 - 5.3 mmol/L Final     Creatinine   Date Value Ref Range Status   09/25/2017 0.89 0.52 - 1.04 mg/dL Final     BP Readings from Last 3 Encounters:   09/25/17 136/83   05/09/17 120/80   05/06/17 98/63

## 2017-09-29 RX ORDER — FUROSEMIDE 20 MG
TABLET ORAL
Status: SHIPPED
Start: 2017-09-29 | End: 2018-05-02

## 2017-09-29 NOTE — PROGRESS NOTES
9/27/17 Rec'd tele call from [facility] to report that client's request for standing orders had been denied.  Inquired on reason for denial, CM informed that the request would not work at this time. CM enc'd to resubmit the request monthly.   Tele [phone]  Anna Ely RN, BC  Supervisor [facility]   [phone] (Fax)

## 2017-09-29 NOTE — TELEPHONE ENCOUNTER
6 month RX sent 9/25/17. Receipt confirmed by pharmacy (9/25/2017 11:03 AM CDT)-note to pharmacy.  Melissa Henry RN

## 2017-10-02 NOTE — PROGRESS NOTES
Re-faxed [employer] Standing Orders Request.  CM to follow.  Anna Ely RN, BC  Supervisor [facility]   [phone] (Fax)

## 2017-10-05 ENCOUNTER — CARE COORDINATION (OUTPATIENT)
Dept: GERIATRIC MEDICINE | Facility: CLINIC | Age: 82
End: 2017-10-05

## 2017-10-05 DIAGNOSIS — Z76.89 HEALTH CARE HOME: ICD-10-CM

## 2017-10-06 DIAGNOSIS — E78.00 PURE HYPERCHOLESTEROLEMIA: ICD-10-CM

## 2017-10-06 RX ORDER — ATORVASTATIN CALCIUM 20 MG/1
TABLET, FILM COATED ORAL
Qty: 90 TABLET | Refills: 3 | Status: SHIPPED | OUTPATIENT
Start: 2017-10-06

## 2017-10-06 NOTE — TELEPHONE ENCOUNTER
atorvastatin (LIPITOR) 20 MG tablet     Last Written Prescription Date: 8/10/17  Last Fill Quantity: 30, # refills: 1  Last Office Visit with [facility], P or [facility] prescribing provider: Gume 9/25/17       Lab Results   Component Value Date    CHOL 108 09/25/2017     Lab Results   Component Value Date    HDL 52 09/25/2017     Lab Results   Component Value Date    LDL 37 09/25/2017     Lab Results   Component Value Date    TRIG 94 09/25/2017     Lab Results   Component Value Date    CHOLHDLRATIO 2.2 08/01/2015

## 2017-10-06 NOTE — PROGRESS NOTES
"Home visit/Jorge Risk Assessment/EW screening completed on:  10/5/2017  Met with client only, her niece Nikia was unavailable. Client reports that she is doing well, reports no concerns. CM observed client to reach out and touch furniture/walls while ambulating in the home. Client's sister Darren enc'd client to use the walker, she declined. Client reports no falls this past year. Client denies pain, but when questioned more, she stated \"some foot pain.\"   Client alert and oriented to self, family, routine and recent events. Katzim score 14, not able to do recall and unable to identify year, client stated that she had no concerns with her memory.   Client stated that she does enjoy [employer], enjoys spending time with family and great nephews.   Member resides: Privately owned home no stairs  On main level, client has 1-2 steps to enter home -grab bar and hand rails present. Client resides with her sister Darren, niece Nikia Esposito and her family.   Member currently receiving the following services: [facility], [employer]     See EMR for a list of client's diagnoses and medications.     Medication management:   Medications reviewed:Yes.   Medication management: Independent-does not set up.   Medication understanding:Patient has understanding of regimen and is adherent No, client is able to identify medications and directions, but states that she prefers not to take Advair and Furosemide twice daily.  MTM offered., client declined.   Member Mood/behavior-PHQ 2 score:  0. Client states that she satisfied with her life, denies feeling down/anxious, states that she sleeps well.  Client quiet during the assessment, giving only brief responses.  Plan OF Care: Cont [facility] and [employer]. Will f/u with Nikia to review LTCC and offer ADC to arrange [employer] for all transportation.   Follow-Up Plan: Member informed of future contact, plan to f/u with member with a 6 month telephone assessment.  Contact information shared with member and " family, encouraged member to call with any questions or concerns prior to this.    See [facility] for further detailed information  Anna Ely RN, BC  Supervisor [facility]   [phone] (Fax)

## 2017-10-11 DIAGNOSIS — I10 ESSENTIAL HYPERTENSION, BENIGN: ICD-10-CM

## 2017-10-11 NOTE — TELEPHONE ENCOUNTER
3 month Supply with 3 RF sent 9/25/17.    carvedilol (COREG) 25 MG tablet  Last Written Prescription Date: 9/25/17  Last Fill Quantity: 180,  # refills: 3   Last Office Visit with [facility], P or [facility] prescribing provider:  9/25/2017                                              Fax sent to pharm informing above.  Please disregard request.    KATELYN Laguna  October 11, 2017  9:31 AM

## 2017-10-12 ENCOUNTER — CARE COORDINATION (OUTPATIENT)
Dept: GERIATRIC MEDICINE | Facility: CLINIC | Age: 82
End: 2017-10-12

## 2017-10-12 DIAGNOSIS — Z76.89 HEALTH CARE HOME: ICD-10-CM

## 2017-10-12 RX ORDER — CARVEDILOL 25 MG/1
TABLET ORAL
Qty: 180 TABLET | Refills: 3 | Status: SHIPPED | OUTPATIENT
Start: 2017-10-12

## 2017-10-12 NOTE — TELEPHONE ENCOUNTER
Rx resent.     Prescription approved per [facility] Refill Protocol.    Jenniffer GUALLPA RN, BSN, PHN  [place] [facility] RN

## 2017-10-12 NOTE — PROGRESS NOTES
Call placed to client's katharine Montejo to review home visit/assessment completed on 10/5/2017. Reviewed LTCC, Katzim score increase to 14. Explained that client was quiet during the assessment, flat affect.  Nikai states that client is indep with ADL's, and if client would require assistance Nikia states client would need more assistance in the home. Nikia confirmed that client has had no falls, although they are concerned because of her foot pain and client's resistance in using her walker/cane.    Reviewed home medication management, shared that client is only taking her Furosemide once daily vs twice daily as ordered.  Nikia states that client is self managing her medications. Nikia stated that they have noticed a decline in client over the last 6 months, resistive in following recommendations (walker, preference to wear old tennis shoes when she has new DM shoes with orthotics, quiet). Explained that client had an order for Aricept, per Nikia client has declined.   Had conversation on touring AL facilities and placing client's name on the wait list, Nikia is in agreement, preference would be [email or web address]. CM offered assistance if needed.   Reviewed [facility], CM to authorize [facility] transportation due to the difficulty setting up standing orders and then cancel the Go To Card, Nikia in agreement. Explained that the plan should start 11/1/17. Nikia shared that client rec'd a bill for $60 from a recent dental appt, will call CM back with the information.  Voice message left with [facility] requesting RT transportation eff 11/01/17, CM provided client's [employer] ID number. Request a call back to confirm.  Anna Ely RN, BC  Supervisor [facility]   [phone] (Fax)

## 2017-10-16 ENCOUNTER — CARE COORDINATION (OUTPATIENT)
Dept: GERIATRIC MEDICINE | Facility: CLINIC | Age: 82
End: 2017-10-16

## 2017-10-16 NOTE — PROGRESS NOTES
Call placed to [facility], spoke with Adriana to request [facility] make arrangements for RT transportation. Explained auth has been completed and sent to [facility], eff 11/01/17  Adriana will be in contact with client/family on transportation times.   Anna Ely RN, BC  Supervisor [facility]   [phone] (Fax)

## 2017-10-16 NOTE — PROGRESS NOTES
Received after visit chart from care coordinator.  Completed following tasks: Mailed copy of care plan to client  Updated services in access  Submitted referrals/auths for [facility], [facility]ERS  Chart was returned to CC.     Mónica Anaya  Case Management Specialist  [facility]  [phone]

## 2017-10-16 NOTE — PROGRESS NOTES
Received a request to submit a DTR for the termination of a To Go Card. Documentation completed and faxed to the health plan.  aware.    Viridiana Chris RN  Utilization   [facility]  [phone]

## 2017-10-22 DIAGNOSIS — E87.6 HYPOKALEMIA: ICD-10-CM

## 2017-10-23 ENCOUNTER — CARE COORDINATION (OUTPATIENT)
Dept: GERIATRIC MEDICINE | Facility: CLINIC | Age: 82
End: 2017-10-23

## 2017-10-23 NOTE — PROGRESS NOTES
Per client's niece Nikia, client has rec'd a bill from [facility] for 66.25, Patient Acct [id number]  Left vm with [facility] [employer] requesting a return call.  Anna Ely RN, BC  Supervisor [facility]   [phone] (Fax)

## 2017-10-23 NOTE — PROGRESS NOTES
The DTR review team has reviewed your request for the member Nataliia Capo.     Decision: The reviewer agrees with CC recommendation to terminate services.  Item/Service: DTR Go To Card.  Amount:   Start date of termination: 10/27/17.      A letter will be sent to the member, provider/agency and MD and an authorization/denial has been put into the system so there is no need to send in a referral request form.  Anna Ely RN, BC  Supervisor [facility]   [phone] (Fax)

## 2017-10-24 NOTE — TELEPHONE ENCOUNTER
Routing refill request to provider for review/approval because:  Drug interaction warning with spironolactone     Patty Marshall RN

## 2017-10-25 RX ORDER — POTASSIUM CHLORIDE 1500 MG/1
TABLET, EXTENDED RELEASE ORAL
Qty: 120 TABLET | Refills: 6 | Status: SHIPPED | OUTPATIENT
Start: 2017-10-25 | End: 2018-05-16

## 2017-10-25 NOTE — PROGRESS NOTES
10/24/17 Rec'd vm from La Nena at [facility] stating that she had spoken with family a couple of weeks ago to update. La Nena states that May-August claims are being reprocessed and they are waiting for this to be taken care of.  La Nena direct number for further questions [phone]  Anna Ely RN, BC  Supervisor [facility]   [phone] (Fax)

## 2017-11-08 ENCOUNTER — CARE COORDINATION (OUTPATIENT)
Dept: GERIATRIC MEDICINE | Facility: CLINIC | Age: 82
End: 2017-11-08

## 2017-11-08 DIAGNOSIS — G57.93 NEUROPATHIC PAIN OF BOTH FEET: ICD-10-CM

## 2017-11-08 DIAGNOSIS — E11.42 DIABETIC POLYNEUROPATHY ASSOCIATED WITH TYPE 2 DIABETES MELLITUS (H): Primary | ICD-10-CM

## 2017-11-08 NOTE — PROGRESS NOTES
11/6/17 Rec'd vm from client's niece Nikia inquiring if client would be able to obtain a w/c. Nikia states that she has concerns for client's safety to walk this winter (ice) to the end of the driveway to access [facility].  Client attends [facility] 3x/day and [facility] provides transportation.   11/8/17 Left vm with Nikia requesting a return call. Explained that CM is unsure if client would meet Medicare eligibility but will process request with a DME provider and if needed will review options.    CM to f/u.    Anna Ely RN, BC  Supervisor [facility]   [phone] (Fax)

## 2017-11-08 NOTE — PROGRESS NOTES
Received a request below:   Nilda Ely, KIMMIE Osorio, Jaelyn STAHL DPM, Podiatry/Foot and Ankle Surgery                   Dr. Osorio,   I am a  with [facility] working with Nataliia Scanlon. I am following up on the information below. I spoke with the [facility] compound clinic and they will attempt to process this order, stating that it is likely not covered without a prior authorization. If you could send a new prescription to the [facility] pharmacy they will initiate the order.   Thank you, feel free to contact me if you have any questions.   Anna Ely RN, BC   Supervisor [facility]    [phone] (Fax)       Order placed to [facility].    Jaelyn osorio DPM

## 2017-11-08 NOTE — PROGRESS NOTES
Spoke with client's niece Nikia, shared info below, CM in agreement of falls risk with client walking down the driveway during colder weather. Will place referral with [facility] for manual w/c. Explained that if Medicare criteria is not met, will discuss alternative options.  Nikia states that the [facility] called, shared that client has increased l/e edema and a 4# weight gain each month. Nikia states that she scheduled an appt with the [facility] for tomorrow.  Nikia shared that she is unsure on medication adherence, as they had a cleaning service come and they found 8 pills on the floor in client's bedroom.   Nikia states that she will begin to set up medication in a pill box for client.  CM provided info on PMD for a future reference.  Nikia also shared that client is on the waiting list at the [facility] and [facility]. Shared that preference would be the [facility], as her needs can be met in a AL.   Explained that CM offered to f/u on the compound ointment that was ordered by Dr. Johnson for client's painful feet. Nikia acknowledged that client has pain. Explained that CM was informed that the compound ointment is expensive and may not be covered by insurance. Explained that CM will f/u with  Compound pharmacy  Call placed to  [facility] Pharm[facility] [phone], spoke with Kaitlin. Kaitlin states that it is likely not covered by insurance, but a prior authorization can be requested. Kaitlin requests a new Rx be sent directly to the Compound pharmacy  EPIC message sent to Dr. Johnson.  CM to follow.  Anna Ely RN, BC  Supervisor [facility]   [phone] (Fax)

## 2017-11-09 ENCOUNTER — OFFICE VISIT (OUTPATIENT)
Dept: FAMILY MEDICINE | Facility: CLINIC | Age: 82
End: 2017-11-09
Payer: COMMERCIAL

## 2017-11-09 ENCOUNTER — TELEPHONE (OUTPATIENT)
Dept: FAMILY MEDICINE | Facility: CLINIC | Age: 82
End: 2017-11-09

## 2017-11-09 VITALS
TEMPERATURE: 97.5 F | BODY MASS INDEX: 38.87 KG/M2 | OXYGEN SATURATION: 96 % | WEIGHT: 198 LBS | DIASTOLIC BLOOD PRESSURE: 82 MMHG | HEIGHT: 60 IN | SYSTOLIC BLOOD PRESSURE: 100 MMHG | RESPIRATION RATE: 24 BRPM | HEART RATE: 79 BPM

## 2017-11-09 DIAGNOSIS — I50.9 CONGESTIVE HEART FAILURE, UNSPECIFIED CONGESTIVE HEART FAILURE CHRONICITY, UNSPECIFIED CONGESTIVE HEART FAILURE TYPE: Primary | ICD-10-CM

## 2017-11-09 PROCEDURE — 99213 OFFICE O/P EST LOW 20 MIN: CPT | Performed by: FAMILY MEDICINE

## 2017-11-09 NOTE — PROGRESS NOTES
SUBJECTIVE:   Nataliia Scanlon is a 82 year old female who presents to clinic today for the following health issues:      Edema      Duration: 2 mos    Description (location/character/radiation): bilateral leg swelling    Intensity:  mild    Accompanying signs and symptoms: weight gain about 8 lbs in 2 mos    HTherapies tried and outcome: Lasix only been taking once daily-just started yesterday BID       Wt Readings from Last 4 Encounters:   11/09/17 198 lb (89.8 kg)   09/25/17 187 lb 11.2 oz (85.1 kg)   05/09/17 192 lb (87.1 kg)   03/15/17 188 lb 9.6 oz (85.5 kg)     Patient state she was not aware she had to even take this medication twice a day.   Denies any orthopnea, shortness of breath, or any other symptoms at this time.       Patient's caregiver has questions about wheelchair- seems this already being worked on by .     Problem list and histories reviewed & adjusted, as indicated.  Additional history: as documented    Patient Active Problem List   Diagnosis     HTN, goal below 140/90     Chronic airway obstruction (H)     Disorder of bone and cartilage     Venous insufficiency     HYPERLIPIDEMIA LDL GOAL <100     Health Care Home     Gout     ACP (advance care planning)     Aortic sclerosis     Vitamin D deficiency     Bursitis of hip     Syncope     Facial fracture due to fall (H)     Troponin level elevated     Maxillary fracture (H)     Hypokalemia     Edema     DJD (degenerative joint disease) of knee     Toe infection     CHF (congestive heart failure) (H)     Mitral regurgitation     Tricuspid regurgitation     Pain in limb     Dermatitis     Type 2 diabetes mellitus with other circulatory complications     Atrial fibrillation (H)     Osteopenia     Chronic obstructive pulmonary disease, unspecified COPD type (H)     Chronic pain syndrome     Diabetic polyneuropathy associated with type 2 diabetes mellitus (H)     Past Surgical History:   Procedure Laterality Date     C NONSPECIFIC  PROCEDURE      hosp x 1 for HTN       Social History   Substance Use Topics     Smoking status: Never Smoker     Smokeless tobacco: Never Used     Alcohol use No     Family History   Problem Relation Age of Onset     Hypertension Mother      Hypertension Sister              Reviewed and updated as needed this visit by clinical staff       Reviewed and updated as needed this visit by Provider       ROS:  Constitutional, HEENT, cardiovascular, pulmonary, gi and gu systems are negative, except as otherwise noted.      OBJECTIVE:   /82 (BP Location: Right arm, Patient Position: Chair, Cuff Size: Adult Large)  Pulse 79  Temp 97.5  F (36.4  C) (Oral)  Resp 24  Ht 5' (1.524 m)  Wt 198 lb (89.8 kg)  SpO2 96%  BMI 38.67 kg/m2  Body mass index is 38.67 kg/(m^2).  GENERAL: healthy, alert and no distress  RESP: lungs clear to auscultation - no rales, rhonchi or wheezes  CV: regular rate and rhythm, normal S1 S2  MS: 1+ edema bilaterally     Diagnostic Test Results:  none     ASSESSMENT/PLAN:         1. Congestive heart failure, unspecified congestive heart failure chronicity, unspecified congestive heart failure type (H)  - patient to increase lasix to twice a day and not once a day. Recommend weight checks at  which can be faxed to our office for review.     See Patient Instructions    Yady Fragoso MD  [facility]

## 2017-11-09 NOTE — PATIENT INSTRUCTIONS
Continue with lasix twice a day rather than daily.   Please follow up for increasing weight, shortness of breath or any other concerns.  Follow up as needed

## 2017-11-09 NOTE — LETTER
November 9, 2017      Nataliia Scanlon  [address] [place] [address]        To Whom It May Concern:    Nataliia Scanlon  was seen on 11/9/17. Please check her weight twice a week for   the next 2 weeks and fax to our office [phone].   For further questions or concerns please let us know.       Sincerely,          Dr. Fouzia MD

## 2017-11-09 NOTE — MR AVS SNAPSHOT
"              After Visit Summary   11/9/2017    Nataliia Scanlon    MRN: [id number]           Patient Information     Date Of Birth          1935        Visit Information        Provider Department      11/9/2017 3:30 PM Yady Fragoso MD [facility]        Care Instructions    Continue with lasix twice a day rather than daily.   Please follow up for increasing weight, shortness of breath or any other concerns.  Follow up as needed            Follow-ups after your visit        Your next 10 appointments already scheduled     Mar 26, 2018  9:30 AM CDT   SHORT with Mari Dunaway MD   [facility]    [address] [address] [phone]              Who to contact     If you have questions or need follow up information about today's clinic visit or your schedule please contact [facility] directly at [phone].  Normal or non-critical lab and imaging results will be communicated to you by MyChart, letter or phone within 4 business days after the clinic has received the results. If you do not hear from us within 7 days, please contact the clinic through Scint-Xhart or phone. If you have a critical or abnormal lab result, we will notify you by phone as soon as possible.  Submit refill requests through Pawzii or call your pharmacy and they will forward the refill request to us. Please allow 3 business days for your refill to be completed.          Additional Information About Your Visit        Scint-Xhart Information     Pawzii lets you send messages to your doctor, view your test results, renew your prescriptions, schedule appointments and more. To sign up, go to [email or web address] . Click on \"Log in\" on the left side of the screen, which will take you to the Welcome page. Then click on \"Sign up Now\" on the right side of the page.     You will be asked to enter the access code listed " below, as well as some personal information. Please follow the directions to create your username and password.     Your access code is: [email or web address]  Expires: 2017  9:56 AM     Your access code will  in 90 days. If you need help or a new code, please call your [place] clinic or [phone].        Care EveryWhere ID     This is your Care EveryWhere ID. This could be used by other organizations to access your [place] medical records  [id number]        Your Vitals Were     Pulse Temperature Respirations Height Pulse Oximetry BMI (Body Mass Index)    79 97.5  F (36.4  C) (Oral) 24 5' (1.524 m) 96% 38.67 kg/m2       Blood Pressure from Last 3 Encounters:   17 100/82   17 136/83   17 120/80    Weight from Last 3 Encounters:   17 198 lb (89.8 kg)   17 187 lb 11.2 oz (85.1 kg)   17 192 lb (87.1 kg)              Today, you had the following     No orders found for display       Primary Care Provider Office Phone # Fax #    Mari Dunaway [phone] [address] [address]        Equal Access to Services     EDDA FULTON AH: Hadii mirna torres hadasho Soomaali, waaxda luqadaha, qaybta kaalmada adeegyada, jeana sanchez haybrent chand . So [facility] [phone].    ATENCIÓN: Si habla español, tiene a maddox disposición servicios gratuitos de asistencia lingüística. Llame al [phone].    We comply with applicable federal civil rights laws and [place] laws. We do not discriminate on the basis of race, color, national origin, age, disability, sex, sexual orientation, or gender identity.            Thank you!     Thank you for choosing [facility]  for your care. Our goal is always to provide you with excellent care. Hearing back from our patients is one way we can continue to improve our services. Please take a few minutes to complete the written survey that you may receive in the mail after your visit with us. Thank  you!             Your Updated Medication List - Protect others around you: Learn how to safely use, store and throw away your medicines at www.disposemymeds.org.          This list is accurate as of: 11/9/17  4:03 PM.  Always use your most recent med list.                   Brand Name Dispense Instructions for use Diagnosis    ADVAIR DISKUS 250-50 MCG/DOSE diskus inhaler   Generic drug:  fluticasone-salmeterol     3 Inhaler    INHALE 1 PUFF INTO THE LUNGS TWICE DAILY    Chronic obstructive pulmonary disease, unspecified COPD type (H)       * albuterol 108 (90 BASE) MCG/ACT Inhaler    PROAIR HFA/PROVENTIL HFA/VENTOLIN HFA    1 Inhaler    Inhale 2 puffs into the lungs every 6 hours as needed for shortness of breath / dyspnea or wheezing    COPD exacerbation (H)       * albuterol (2.5 MG/3ML) 0.083% neb solution     30 vial    Take 1 vial (2.5 mg) by nebulization every 6 hours as needed for shortness of breath / dyspnea or wheezing    COPD exacerbation (H)       ascorbic acid 500 MG tablet    VITAMIN C     Take 500 mg by mouth daily        aspirin 81 MG tablet     100 tablet    Take 1 tablet (81 mg) by mouth daily    Type 2 diabetes mellitus with other circulatory complications       atorvastatin 20 MG tablet    LIPITOR    90 tablet    TAKE 1 TABLET(20 MG) BY MOUTH DAILY    Pure hypercholesterolemia       CALCIUM 500+D HIGH POTENCY 500-400 MG-UNIT Tabs per tablet   Generic drug:  calcium carbonate-vitamin D     270 tablet    TAKE 1 TABLET BY MOUTH THREE TIMES DAILY    Osteopenia       * carvedilol 25 MG tablet    COREG    180 tablet    Take 1 tablet (25 mg) by mouth 2 times daily (with meals)    Essential hypertension, benign       * carvedilol 25 MG tablet    COREG    180 tablet    TAKE 1 TABLET(25 MG) BY MOUTH TWICE DAILY WITH MEALS    Essential hypertension, benign       * COMPOUNDED NON-CONTROLLED SUBSTANCE - PHARMACY TO MIX COMPOUNDED MEDICATION    CMPD RX    120 g    Apply 1-2g to the feet 3-4 times a day as  needed for pain.    Pain in both feet, Diabetic polyneuropathy associated with type 2 diabetes mellitus (H), Pes planus of both feet, Venous insufficiency of both lower extremities, Primary localized osteoarthrosis, ankle and foot, right, Primary localized osteoarthrosis, ankle and foot, left       * COMPOUNDED NON-CONTROLLED SUBSTANCE - PHARMACY TO MIX COMPOUNDED MEDICATION    CMPD RX    120 g    Apply 1-2g to the feet 3-4 times a day as needed for pain.    Diabetic polyneuropathy associated with type 2 diabetes mellitus (H), Neuropathic pain of both feet       donepezil 5 MG tablet    ARIcept     Take 1 tablet by mouth daily Reported on 5/9/2017        fish oil-omega-3 fatty acids 1000 MG capsule      Take 1 g by mouth daily        fluticasone 50 MCG/ACT spray    FLONASE    1 Bottle    Spray 1-2 sprays into both nostrils daily    Chronic rhinitis       * furosemide 20 MG tablet    LASIX    180 tablet    TAKE 1 TABLET(20 MG) BY MOUTH TWICE DAILY    Congestive heart failure, unspecified congestive heart failure chronicity, unspecified congestive heart failure type (H)       * furosemide 20 MG tablet    LASIX     TAKE 1 TABLET(20 MG) BY MOUTH TWICE DAILY    CHF (congestive heart failure) (H)       hydrALAZINE 25 MG tablet    APRESOLINE    180 tablet    TAKE 2 TABLETS(50 MG) BY MOUTH DAILY    Essential hypertension, benign       ipratropium - albuterol 0.5 mg/2.5 mg/3 mL 0.5-2.5 (3) MG/3ML neb solution    DUONEB    3 mL    Take 1 vial (3 mLs) by nebulization once for 1 dose    Chest congestion       JOBST RELIEF KNEE HIGH/MEDIUM Misc     1 each    1 each daily    Bilateral edema of lower extremity       losartan 100 MG tablet    COZAAR    90 tablet    TAKE 1 TABLET(100 MG) BY MOUTH DAILY    HTN, goal below 140/90       Magnesium 400 MG Caps      Take 1 tablet by mouth daily        OCUVITE PO      Take 1 capsule by mouth daily        * order for DME     1 Units    Equipment being ordered: Nebulizer    COPD exacerbation  (H)       * order for DME     1 each    Equipment being ordered: diabetic shoe inserts    Type 2 diabetes mellitus with other circulatory complications       * order for DME     1 each    Equipment being ordered: diabetic shoes    Type 2 diabetes mellitus with other circulatory complications       * order for DME     1 Device    Device to put on compression socks    Pain in both feet, Diabetic polyneuropathy associated with type 2 diabetes mellitus (H), Pes planus of both feet, Venous insufficiency of both lower extremities, Primary localized osteoarthrosis, ankle and foot, right, Primary localized osteoarthrosis, ankle and foot, left       potassium chloride SA 20 MEQ CR tablet    K-DUR/KLOR-CON M    120 tablet    TAKE 2 TABLETS BY MOUTH TWICE DAILY    Hypokalemia       spironolactone 25 MG tablet    ALDACTONE    90 tablet    Take 1 tablet (25 mg) by mouth daily    Hypokalemia       traMADol 50 MG tablet    ULTRAM    60 tablet    TAKE ONE TABLET BY MOUTH EVERY 6 HOURS AS NEEDED FOR MODERATE PAIN    Pain of upper extremity, unspecified laterality       * Notice:  This list has 12 medication(s) that are the same as other medications prescribed for you. Read the directions carefully, and ask your doctor or other care provider to review them with you.

## 2017-11-09 NOTE — TELEPHONE ENCOUNTER
4 page fax received from [employer] for Dr Galeano's signature.  Forms are in Dr Galeano's in-basket for review/signature.  Please fax forms back to [phone].  No HARPREET attached to fax.    Jennifer Gutierres/

## 2017-11-13 ENCOUNTER — MEDICAL CORRESPONDENCE (OUTPATIENT)
Dept: HEALTH INFORMATION MANAGEMENT | Facility: CLINIC | Age: 82
End: 2017-11-13

## 2017-11-20 DIAGNOSIS — M79.603 PAIN OF UPPER EXTREMITY, UNSPECIFIED LATERALITY: ICD-10-CM

## 2017-11-20 RX ORDER — TRAMADOL HYDROCHLORIDE 50 MG/1
TABLET ORAL
Qty: 60 TABLET | Refills: 0 | Status: SHIPPED | OUTPATIENT
Start: 2017-11-20 | End: 2018-01-02

## 2017-11-20 NOTE — TELEPHONE ENCOUNTER
Routing refill request to provider for review/approval because:  Drug not on the [facility] refill protocol     Isabela Potter RN, BS  Clinical Nurse Triage.

## 2017-11-20 NOTE — TELEPHONE ENCOUNTER
Pending Prescriptions:                       Disp   Refills    traMADol (ULTRAM) 50 MG tablet [Pharmacy *60 tab*0            Sig: TAKE 1 TABLET BY MOUTH EVERY 6 HOURS AS NEEDED    Controlled Substance Refill Request for   Problem List Complete:  No     PROVIDER TO CONSIDER COMPLETION OF PROBLEM LIST AND OVERVIEW/CONTROLLED SUBSTANCE AGREEMENT    Last Written Prescription Date:  09/20/2017  Last Fill Quantity: 60,   # refills: 0    Last Office Visit with [facility] primary care provider: 11/09/2017    Future Office visit:     Controlled substance agreement on file: No.     Processing:  Fax Rx to [facility] pharmacy     checked in past 6 months?  No, route to KIMMIE AGUILAT

## 2017-12-04 ENCOUNTER — CARE COORDINATION (OUTPATIENT)
Dept: GERIATRIC MEDICINE | Facility: CLINIC | Age: 82
End: 2017-12-04

## 2017-12-04 NOTE — PROGRESS NOTES
"Rec'd vm from client's niece Nikia inquiring on status of w/c  Noted last correspondence with [facility] re w/c 11/30/17 \"eval pending.\" Secure e-mail sent to Salazar at [facility] to f/u on referral, inquired if CM can assist.  Call placed to Nikia to share the above information. CM also shared that client is due to see Dr. Lamar this month, Nikia states that client rec'd a letter and will assist with scheduling.  Inquired if client has rec'd any further dental bills, per Nikia client recently rec'd a bill for a service charge of $1.67.  Nikia is aware that the original bill was in error due to a computer error.   CM to f/u with Nikia re w/c.  Anna Ely RN, BC  Supervisor [facility]   [phone] (Fax)    "

## 2017-12-05 NOTE — PROGRESS NOTES
Rec'd the following e-mail from Salazar at [facility]  We have spoken to family regarding the chair.  Everything is now in place and this should be in scheduling shortly  Call placed to Nikia, who states that she did receive a call from Salazar, had discussion on type of w/c client would benefit from. Nikia is unaware of a delivery date. Enc'd Nikia to f/u with CM in a couple of days if she has not rec'd a call back from [facility].   Rec'd vm from Ana at [facility] requesting a return call. [phone]  Call placed to Ana who stated that client's auth ended 10/31/17,  Explained that auth has been completed, CM provided [employer] referral number.   Anna Ely RN, BC  Supervisor [facility]   [phone] (Fax)

## 2017-12-08 ENCOUNTER — TELEPHONE (OUTPATIENT)
Dept: FAMILY MEDICINE | Facility: CLINIC | Age: 82
End: 2017-12-08

## 2017-12-08 NOTE — TELEPHONE ENCOUNTER
3 page fax received from [facility] [place] for Dr Dunaway's signature.  Forms are in Dr Dunaway's in-basket for review/signature.  Please fax forms back to [phone].    Jennifer Gutierres/

## 2017-12-11 DIAGNOSIS — E87.6 HYPOKALEMIA: ICD-10-CM

## 2017-12-12 ENCOUNTER — TELEPHONE (OUTPATIENT)
Dept: PODIATRY | Facility: CLINIC | Age: 82
End: 2017-12-12

## 2017-12-12 RX ORDER — SPIRONOLACTONE 25 MG/1
TABLET ORAL
Status: SHIPPED
Start: 2017-12-12 | End: 2018-05-02

## 2017-12-12 NOTE — TELEPHONE ENCOUNTER
Please do not close this encounter until this has been addressed.  (prior auth approved/denied, prescriber refusal to complete prior auth or medication changed/discontinued)    Prior Authorization needed on: Ketamin/Clonidine/Gabapentin/Imipramine/Pentoxyfylline Compound       Insurance: [employer]  Rx BIN: [id number]  Rx PCN: [id number]  Member ID: [id number]   Insurance phone #: [phone]    Pharmacy NPI: [id number]  Pharmacy Phone #: [phone]  Pharmacy Fax #: [phone]    Please let us know if the PA gets approved or denied or if medication is changed.    Thank You,  Princess New CPhT  On behalf of [facility]

## 2017-12-12 NOTE — TELEPHONE ENCOUNTER
Year RX sent 9/25/17.  See computer.  Receipt confirmed by pharmacy (9/25/2017 11:03 AM CDT)    Melissa Henry RN

## 2017-12-21 ENCOUNTER — CARE COORDINATION (OUTPATIENT)
Dept: GERIATRIC MEDICINE | Facility: CLINIC | Age: 82
End: 2017-12-21

## 2017-12-21 NOTE — PROGRESS NOTES
Per [facility] w/c referral is in scheduling. Call placed to client's niece Nikia to inform, not able to leave voice message. CM to follow.   Anna Ely RN, BC  Supervisor [facility]   [phone] (Fax)

## 2018-01-01 DIAGNOSIS — I50.9 CHF (CONGESTIVE HEART FAILURE) (H): Primary | ICD-10-CM

## 2018-01-02 DIAGNOSIS — M79.603 PAIN OF UPPER EXTREMITY, UNSPECIFIED LATERALITY: ICD-10-CM

## 2018-01-02 NOTE — TELEPHONE ENCOUNTER
Disp Refills Start End KOFI   traMADol (ULTRAM) 50 MG tablet 60 tablet 0 11/20/2017  No   Sig: TAKE 1 TABLET BY MOUTH EVERY 6 HOURS AS NEEDED     Last OV: 11.9.17    Routing refill request to provider for review/approval because:  Drug not on the G refill protocol     Isabela Potter RN, BS  Clinical Nurse Triage.

## 2018-01-03 RX ORDER — ASPIRIN 81 MG
TABLET, DELAYED RELEASE (ENTERIC COATED) ORAL
Qty: 100 TABLET | Refills: 0 | Status: SHIPPED | OUTPATIENT
Start: 2018-01-03 | End: 2018-04-12

## 2018-01-03 RX ORDER — TRAMADOL HYDROCHLORIDE 50 MG/1
TABLET ORAL
Qty: 60 TABLET | Refills: 0 | Status: SHIPPED | OUTPATIENT
Start: 2018-01-03 | End: 2018-02-27

## 2018-01-03 NOTE — TELEPHONE ENCOUNTER
Requested Prescriptions   Pending Prescriptions Disp Refills     ASPIRIN LOW DOSE 81 MG EC tablet [Pharmacy Med Name: ASPIRIN 81MG EC DR LOW DOSE TABLETS] 100 tablet 0    Last Written Prescription Date:  12/7/16  Last Fill Quantity: 100,  # refills: 3   [facility] Visit with [facility] or [facility] prescribing provider:  11/9/2017   Future Office Visit:    Next 5 appointments (look out 90 days)     Jan 22, 2018  7:45 AM CST   Return Visit with Ananth Lamar MD   [facility] ([facility]    [address]  [facility] [id number] [phone]            Mar 26, 2018  9:30 AM CDT   SHORT with Mari Dunaway MD   [facility]    [address] [place] [address] [phone]                  Sig: TAKE 1 TABLET BY MOUTH EVERY DAY    Analgesics (Non-Narcotic Tylenol and ASA Only) Passed    1/1/2018  3:45 AM       Passed - Recent or future visit with authorizing provider's specialty    Patient had office visit in the last year or has a visit in the next 30 days with authorizing provider.  See chart review.          Passed - Patient is age 20 years or older    If ASA is flagged for ages under 20 years old. Forward to provider for confirmation Ryes Syndrome is not a concern.

## 2018-01-03 NOTE — TELEPHONE ENCOUNTER
Prescription approved per [facility] Refill Protocol.  Patyt Marshall RN

## 2018-01-03 NOTE — TELEPHONE ENCOUNTER
Faxed Tramadol Rx to [facility] [address] [place] [address]. [phone]. Ruth Behrens. Team Cooridnator

## 2018-01-14 DIAGNOSIS — M85.80 OSTEOPENIA: ICD-10-CM

## 2018-01-15 NOTE — TELEPHONE ENCOUNTER
"Requested Prescriptions   Pending Prescriptions Disp Refills     CALCIUM 500+D HIGH POTENCY 500-400 MG-UNIT TABS per tablet [Pharmacy Med Name: CALCIUM 500MG + D TABLETS] 270 tablet 0     Sig: TAKE 1 TABLET BY MOUTH THREE TIMES DAILY    Vitamin Supplements (Adult) Protocol Passed    1/14/2018  7:50 PM       Passed - High dose Vitamin D not ordered       Passed - Recent or future visit with authorizing provider's specialty    Patient had office visit in the last year or has a visit in the next 30 days with authorizing provider.  See \"Patient Info\" tab in inbasket, or \"Choose Columns\" in Meds & Orders section of the refill encounter.               Last OV: 11.9.17  Last fill: 6.13.17 #270 R 2    Prescription approved per [facility] Refill Protocol  Isabela Potter RN BS    "

## 2018-01-18 ENCOUNTER — PRE VISIT (OUTPATIENT)
Dept: CARDIOLOGY | Facility: CLINIC | Age: 83
End: 2018-01-18

## 2018-01-30 DIAGNOSIS — I10 HTN, GOAL BELOW 140/90: ICD-10-CM

## 2018-01-30 NOTE — TELEPHONE ENCOUNTER
"Requested Prescriptions   Pending Prescriptions Disp Refills     losartan (COZAAR) 100 MG tablet [Pharmacy Med Name: LOSARTAN 100MG TABLETS] 30 tablet 0    Last Written Prescription Date:  09/25/2017  Last Fill Quantity: 90 tablet,  # refills: 3   Last Office Visit with [facility] provider:  11/09/2017   Future Office Visit:    Next 5 appointments (look out 90 days)     Mar 06, 2018  3:15 PM CST   Return Visit with Ananth Lamar MD   [facility] ([facility])    [address]  [place] [place] [facility] [id number] [phone]            Mar 26, 2018  9:30 AM CDT   SHORT with Mari Dunaway MD   [facility]    [address] [place] [address] [phone]                  Sig: TAKE 1 TABLET(100 MG) BY MOUTH DAILY    Angiotensin-II Receptors Passed    1/30/2018  3:44 AM       Passed - Blood pressure under 140/90 in past 12 months.    BP Readings from Last 3 Encounters:   11/09/17 100/82   09/25/17 136/83   05/09/17 120/80                Passed - Recent or future visit with authorizing provider's specialty    Patient had office visit in the last year or has a visit in the next 30 days with authorizing provider.  See \"Patient Info\" tab in inbasket, or \"Choose Columns\" in Meds & Orders section of the refill encounter.            Passed - Patient is age 18 or older       Passed - No active pregnancy on record       Passed - Normal serum creatinine on file in past 12 months    Recent Labs   Lab Test  09/25/17   1058   CR  0.89            Passed - Normal serum potassium on file in past 12 months    Recent Labs   Lab Test  09/25/17   1058   POTASSIUM  5.1                   Passed - No positive pregnancy test in past 12 months          Cleve Parker XRT  "

## 2018-02-01 RX ORDER — LOSARTAN POTASSIUM 100 MG/1
TABLET ORAL
Qty: 30 TABLET | Refills: 0 | OUTPATIENT
Start: 2018-02-01

## 2018-02-01 NOTE — TELEPHONE ENCOUNTER
Duplicate  E-Prescribing Status: Receipt confirmed by pharmacy (9/25/2017 11:03 AM CDT)  Roberto Alvares RN, BSN

## 2018-02-06 ENCOUNTER — CARE COORDINATION (OUTPATIENT)
Dept: GERIATRIC MEDICINE | Facility: CLINIC | Age: 83
End: 2018-02-06

## 2018-02-06 NOTE — PROGRESS NOTES
"2/5/18 Rec'd vm from client's niece Nikia requesting a return call. Nikia states that she a few concerns and wants to provide an update to CM.  [phone]  2/6/185 Call placed to Nikia. Nikia states that the family has noticed a steady decline with everything. Nikia reports a fall this past weekend, stating that client got \"tangled up,\"  Nikia states that they were able to get her up from the floor and that there was no injury. Nikia states that client had fallen another time in the recent past.  Nikia states that the ADC called yesterday to report that client is requesting to use the w/c more frequently.  Nikia inquired on an AL or nursing home. Explained that client would meet the need for an AL. Nikia states that she did contact the [facility] and they have a very long wait list (200)   CM provided info on [facility], [facility] and [facility].  Enc'd Nikia to contact the facilities to set up a tour and place client on a wait list. CM offered to contact the AL facilities, per Nikia she will call to inquire on availability. Enc'd Nikia to call CM as needed, as CM is able to contact AL facilities.   CM to f/u with Nikia next week.   Anna Ely RN, BC  Supervisor [facility]   [phone] (Fax)    "

## 2018-02-13 NOTE — PROGRESS NOTES
Voice message left with client's katharine Montejo to inquire on info below and CM can assist with providing info on AL facilities or contact AL facilities to check on availability.   Anna Ely RN, BC  Supervisor [facility]   [phone] (Fax)

## 2018-02-27 ENCOUNTER — CARE COORDINATION (OUTPATIENT)
Dept: GERIATRIC MEDICINE | Facility: CLINIC | Age: 83
End: 2018-02-27

## 2018-02-27 DIAGNOSIS — M79.603 PAIN OF UPPER EXTREMITY, UNSPECIFIED LATERALITY: ICD-10-CM

## 2018-02-27 DIAGNOSIS — G89.4 CHRONIC PAIN SYNDROME: ICD-10-CM

## 2018-02-27 NOTE — PROGRESS NOTES
Rec'd vm from clients katharine Montejo requesting a return call, [phone]  Call placed to Nikia who shared that [facility] called and said that they have an opening.  Nikia states that she was not aware that they need doctor orders. Explained that client would  need to see PCP who would write the orders, CM can assist if needed. Nikia states that the facility needs to know if they will accept the bed by the end of the week.   Inquired if they has a chance to tour any of the AL facilities, per Nikia they have not. CM expressed concern that client may not need LTC, but would benefit more in an AL facility. Nikia states that client has declined since CM last saw her. Nikia reports 2 falls recently, the last fall one month ago. Nikia states that client is indep with ADL's, but needs much encouragement to change her clothing daily, noticing that client will wear soiled clothes over. Nikia states that client reports that her feet hurt, but will refuse to use the w/c to go to [facility].  Nikia states that [facility] called stating that client needs to bring w/c because she is not able to stand due to painful feet. Nikia states that client can be stubborn and does not realize her limitations.   Discussed AL options, Nikia states that client has never lived on her alone and unsure if client would do well in an AL.   Explained that CM can complete an early visit/assessment and review options with client and family. Nikia states that they will discuss options with client and family and f/u with CM.  Explained that if the plan is to cont to reside at home vs moving to [facility], CM would enc [facility] PT eval due to falls.  Nikia reports that the falls occurred when client was walking in the house from the garage and once when removing her shoes.  CM to follow.  Call placed to [facility], spoke with Adriana who shared that client has been reporting increased leg and foot pain. Adriana has spoken with family and requested  that client utilize w/c when coming to [facility]. Adriana reports that client is involved in many activities, no change in appetite, no falls. Adriana reports concerns with safety and ambulation.   Anna Ely RN, BC  Supervisor [facility]   [phone] (Fax)

## 2018-02-28 NOTE — TELEPHONE ENCOUNTER
Controlled Substance Refill Request for traMADol (ULTRAM) 50 MG tablet  Problem List Complete:  No     PROVIDER TO CONSIDER COMPLETION OF PROBLEM LIST AND OVERVIEW/CONTROLLED SUBSTANCE AGREEMENT    Last Written Prescription Date:  1/3/2018  Last Fill Quantity: 60 tablet,   # refills: 0    Last Office Visit with [facility] primary care provider: 11/9/2017, Fouzia    Future Office visit:   Next 5 appointments (look out 90 days)     Mar 07, 2018  1:30 PM CST   Office Visit with Mari Dunaway MD   [facility] ([facility])    [address] [place] [address]   [phone]            Mar 15, 2018  8:45 AM CDT   Return Visit with Ananth Lamar MD   [facility] ([facility])    [address] [place] [id number] [phone]            Mar 26, 2018  9:30 AM CDT   SHORT with Mari Dunaway MD   [facility] ([facility])    [address] [place] [address]   [phone]                  Controlled substance agreement on file: No.     Processing:  Staff will hand deliver Rx to on-site pharmacy     checked in past 6 months?  Yes 9/20/2017    Last MNP website verification: 9.20.17   [email or web address]

## 2018-03-01 NOTE — TELEPHONE ENCOUNTER
: 3.1.18, looks good    Routing refill request to provider for review/approval because:  Drug not on the [facility] refill protocol     Isabela Potter RN, BS  Clinical Nurse Triage.

## 2018-03-02 NOTE — PROGRESS NOTES
2/28/18 Rec'd vm from Nikia that they made a decision to keep client on the wait list and not to proceed with admission at this time.   3/1/18 VM left with Nikia requesting a return call. CM offered to schedule a home visit to review current services, assist with AL placement, etc.   CM to follow.  Anna Ely RN, BC  Supervisor [facility]   [phone] (Fax)

## 2018-03-05 RX ORDER — TRAMADOL HYDROCHLORIDE 50 MG/1
TABLET ORAL
Qty: 60 TABLET | Refills: 0 | Status: SHIPPED | OUTPATIENT
Start: 2018-03-05 | End: 2018-05-02

## 2018-03-12 ENCOUNTER — PRE VISIT (OUTPATIENT)
Dept: CARDIOLOGY | Facility: CLINIC | Age: 83
End: 2018-03-12

## 2018-03-21 DIAGNOSIS — I50.9 CONGESTIVE HEART FAILURE, UNSPECIFIED CONGESTIVE HEART FAILURE CHRONICITY, UNSPECIFIED CONGESTIVE HEART FAILURE TYPE: ICD-10-CM

## 2018-03-21 NOTE — TELEPHONE ENCOUNTER
"Requested Prescriptions   Pending Prescriptions Disp Refills     furosemide (LASIX) 20 MG tablet [Pharmacy Med Name: FUROSEMIDE 20MG TABLETS] 180 tablet 0    Last Written Prescription Date:  9/25/17  Last Fill Quantity: 180,  # refills: 1   Last Office Visit: 11/9/2017   Future Office Visit:    Next 5 appointments (look out 90 days)     Mar 26, 2018  9:30 AM CDT   SHORT with Mari Dunaway MD   [facility] ([address] [address] [phone]                  Sig: TAKE 1 TABLET BY MOUTH TWICE DAILY    Diuretics (Including Combos) Protocol Passed    3/21/2018  3:46 AM       Passed - Blood pressure under 140/90 in past 12 months    BP Readings from Last 3 Encounters:   11/09/17 100/82   09/25/17 136/83   05/09/17 120/80                Passed - Recent (12 mo) or future (30 days) visit within the authorizing provider's specialty    Patient had office visit in the last 12 months or has a visit in the next 30 days with authorizing provider or within the authorizing provider's specialty.  See \"Patient Info\" tab in inbasket, or \"Choose Columns\" in Meds & Orders section of the refill encounter.           Passed - Patient is age 18 or older       Passed - No active pregancy on record       Passed - Normal serum creatinine on file in past 12 months    Recent Labs   Lab Test  09/25/17   1058   CR  0.89             Passed - Normal serum potassium on file in past 12 months    Recent Labs   Lab Test  09/25/17   1058   POTASSIUM  5.1                   Passed - Normal serum sodium on file in past 12 months    Recent Labs   Lab Test  09/25/17   1058   NA  136             Passed - No positive pregnancy test in past 12 months          "

## 2018-03-22 RX ORDER — FUROSEMIDE 20 MG
TABLET ORAL
Qty: 180 TABLET | Refills: 0 | Status: SHIPPED | OUTPATIENT
Start: 2018-03-22 | End: 2018-05-02

## 2018-04-03 ENCOUNTER — PATIENT OUTREACH (OUTPATIENT)
Dept: GERIATRIC MEDICINE | Facility: CLINIC | Age: 83
End: 2018-04-03

## 2018-04-03 NOTE — PROGRESS NOTES
[facility] Care Coordination Contact    Communication History     User: Nilda Ely RN Date/time: 4/3/2018  8:51 AM    Comment: VM left with Salty that CM agrees with a home visit/assessment. CM provided dates of availability, request a return call.     Context:  Outcome: Left Message    Phone number: [phone] Phone Type:     Comm. type: Telephone Call type: Outgoing    Contact: Luci Esposito Relation to patient: Emergency Contact    User: Nilda Ely RN Date/time: 4/3/2018  8:51 AM    Comment: Recd vm from Nikia requesting a return call. Nikia would like to set up a home visit to discuss future planning     Context:  Outcome:     Phone number: [phone] Phone Type: Mobile    Comm. type: Telephone Call type: Incoming    Contact: Luci Esposito Relation to patient: Emergency Contact        Anna Ely RN, BC  Supervisor [facility]   [phone] (Fax)

## 2018-04-06 NOTE — PROGRESS NOTES
[facility] Care Coordination Contact  Rec'd tele call from client's katharine Montejo to schedule early reassessment, 4/17/18 @ 4 PM.  Anna Ely RN, BC  Supervisor [facility]   [phone] (Fax)

## 2018-04-12 DIAGNOSIS — I50.9 CHF (CONGESTIVE HEART FAILURE) (H): ICD-10-CM

## 2018-04-12 NOTE — PROGRESS NOTES
[facility] Care Coordination Contact  4/11/18 Recd tele call from client's niece Nikia requesting to change home visit to 4/19/18 @ 4 PM.  Anna Ely RN, BC  Supervisor [facility]   [phone] (Fax)

## 2018-04-12 NOTE — TELEPHONE ENCOUNTER
"Requested Prescriptions   Pending Prescriptions Disp Refills     ASPIR-LOW 81 MG EC tablet [Pharmacy Med Name: ASPIRIN 81MG EC LOW DOSE TABLETS]  Last Written Prescription Date:  01/03/2018  Last Fill Quantity: 100,  # refills: 0   Last office visit: 11/9/2017 with prescribing provider:  03/26/2018   Future Office Visit:     100 tablet 0     Sig: TAKE 1 TABLET BY MOUTH EVERY DAY    Analgesics (Non-Narcotic Tylenol and ASA Only) Passed    4/12/2018  3:44 AM       Passed - Recent (12 mo) or future (30 days) visit within the authorizing provider's specialty    Patient had office visit in the last 12 months or has a visit in the next 30 days with authorizing provider or within the authorizing provider's specialty.  See \"Patient Info\" tab in inbasket, or \"Choose Columns\" in Meds & Orders section of the refill encounter.           Passed - Patient is age 20 years or older    If ASA is flagged for ages under 20 years old. Forward to provider for confirmation Ryes Syndrome is not a concern.                "

## 2018-04-13 RX ORDER — ASPIRIN 81 MG/1
TABLET ORAL
Qty: 100 TABLET | Refills: 1 | Status: SHIPPED | OUTPATIENT
Start: 2018-04-13

## 2018-04-13 NOTE — TELEPHONE ENCOUNTER
Prescription approved per [facility] Refill Protocol.    Jenniffer GUALLPA RN, BSN, PHN  [facility] Flex RN

## 2018-04-19 ENCOUNTER — PATIENT OUTREACH (OUTPATIENT)
Dept: GERIATRIC MEDICINE | Facility: CLINIC | Age: 83
End: 2018-04-19

## 2018-04-19 DIAGNOSIS — I48.20 CHRONIC ATRIAL FIBRILLATION (H): ICD-10-CM

## 2018-04-19 DIAGNOSIS — M17.10 PRIMARY OSTEOARTHRITIS OF KNEE, UNSPECIFIED LATERALITY: Primary | ICD-10-CM

## 2018-04-19 DIAGNOSIS — G89.4 CHRONIC PAIN SYNDROME: ICD-10-CM

## 2018-04-20 NOTE — PROGRESS NOTES
"[facility] Care Coordination Contact  4/19/2018 Completed home visit with client, her niece Nikia Esposito and client's sister Darren. Home visit was arranged to assess and discuss future planning needs for client.  Review of ADL's with client and family, client remains independent with all ADL's. Per Nikia client will frequently wear the same clothing daily.  Client has had 2 falls since January, fall entering the home from garage (family made adjustments to the step) and fall while taking her shoes off.   Nikia reports Nataliia has difficulty walking up the front steps into the home.   Family reports increased forgetfulness, \"increased crabbiness\", difficult to redirect.  Family shared that client recently was getting ready for bed at 7 PM and then at 8:30 PM was getting up and dressed for the day, repetitive questioning, forgetting that she had eaten breakfast and stating that she had not eaten. Nikia expressed concern for medication compliance, stating that she had tried to assist with setting medications in pill box, client declined pill box. Nikia states that she has noticed pills on the floor in her bedroom.   Family states that they do not feel that client can be at home by herself and this has caused difficulty with other family obligations. Darren states that she has noticed that client has more difficulty with walking any distance, stating that they use to go to a movie every Tuesday, but client can no longer walk the distance.  Client prefers not to use the w/c.   CM reviewed medications, noted Rx dates that would not be in compliance, noted missing medication bottles.     Explained that client had a recent No Show PCP appt in March.  Nikia states that she was unaware, Nataliia stated that she scheduled her 6 month appt in March and did not miss the appt.   Nikia states that she is aware that she needs to reschedule client's cardiology appt with Dr. Lamar but client has always managed her primary care " "appt's indep.     Had conversation with Nataliia who was quiet during the visit, \"they do not want me here,\" \"I'm fine, I take my medications everyday.\"  Client states that she does enjoy attending [facility]. When asked what is important to her, Nataliia stated that she does not know, then stated, \"I want to stay here.\"   Nikia inquired if there were caregivers that could stay with client in the evening.  Provided info on  services, but unsure of evening staffing.   Reviewed Assisted Living options, client would meet criteria.   Explained that CM did call [facility] in [place], must pay private for 24 months  VM left with [facility] in [place] and [facility] in [place]. Shared that [facility] usually have EW openings.    Offered [facility] OT eval to assess cognition/medication compliance, home safety evaluation, front steps for possible adaptations.    Offered to increase ADC to 4 days/wk.     PLAN  -CM to contact [facility] to inquire on EW availability. Nikia states that she will schedule a tour [phone]  -Obtain orders for HC RN and OT   -CM to f/u with [facility]     4/20/18 Rec'd vm from [facility] at [facility] stating that they do not have EW at this time, but will contact CM when there is availability  4/23/18 VM left with [facility]  at [facility] to inquire on availability  4/23/18 Call placed to [facility], spoke with Adriana who states that client is doing great, they have no concerns, she is active and engaged.   Adriana states that they can accommodate 4 days/wk and they can also administer medications.  In order to do med admin, they need the Rx bottles with labels and they keep the bottles at the day care.     Note: 10/5/2016 Neuropsych consult completed.     4/23/18 Routed EPIC message to PCP to request [facility] referral. RN/OT: med management, home safety eval, cognitive testing.  4/23/18 Per Lilibeth from [facility], orders rec'd but client will require a face to face visit.  Call placed " to Nikia to inform, Nikia states that she will schedule a PCP appt.  Nikia states that her mother and sister did tour [facility] and they really liked it. Nikia states that [facility] will need to do an assessment   4/23/18 Rec'd vm from Smitha Johnson that she met with client's sister and family to tour. Smitha Johnson stated that they will decide if they would like an assessment completed. Smitha Johnson requests a return call to inquire on client's cognition, stating that they do not have any availability in their memory care at this time.  4/24/18 VM left with Smitha Johnson to report that client would not need a memory care unit, no risk of elopement.  Shared client's case mix score of B.   4/25/18 Rec'd vm from Nikia to report that they toured other AL facilities this past week, [facility] ([facility]) has an EW opening. Nikia states that this would be their first choice, she will be completing paperwork today and will take client to tour the facility tomorrow. Nikia states that she will be providing my name as a .  4/25/18 Left vm with [facility] to introduce myself, request a call back. Provided client's case mix score: B, stating that client is currently opened to EW.   4/25/18 Rec'd tele call from [facility] who states that they do have EW availability, just waiting to confirm wait list. Taylor states that client's niece Nikia will bring Nataliia on 4/26/18 for a tour and will go forward with an assessment at client's home pending availability.       CM completed 6 month assessment at home visit on 4/19/2018, face to face  [facility] Six-Month Telephone Assessment      ER visits: No  Hospitalizations: No  TCU stays: No  Significant health status changes: see notes above re memory   Falls/Injuries: Yes: No injuries   ADL/IADL changes: No  Changes in services: Not at this time, see above regarding Assisted Living    Caregiver Assessment follow up:  N/A     Goals: See POC in chart for  goal progress documentation.      Will see member in 6 months for an annual health risk assessment.   Encouraged member to call CC with any questions or concerns in the meantime.   Anna Ely RN, BC  Supervisor [facility]   [phone] (Fax)

## 2018-04-27 ENCOUNTER — TELEPHONE (OUTPATIENT)
Dept: FAMILY MEDICINE | Facility: CLINIC | Age: 83
End: 2018-04-27

## 2018-04-27 NOTE — TELEPHONE ENCOUNTER
Gloria Adrian with [facility].  They just today received HC orders however the order is already >48 hours.    OK to delay start of care to today?    Informed approved per standing orders.   Home Care staff will fax these orders for MD signature.   Nirmal Gamboa RN

## 2018-05-01 ENCOUNTER — PATIENT OUTREACH (OUTPATIENT)
Dept: GERIATRIC MEDICINE | Facility: CLINIC | Age: 83
End: 2018-05-01

## 2018-05-01 NOTE — PROGRESS NOTES
[facility] Care Coordination Contact  Spoke with Taylor at [facility], Taylor states that client did tour, information provided to Clinical Director who will schedule an assessment with client.   Call placed to client's niece Nikia to inform of the above. Nikia has not rec'd a call to schedule the assessment at this time.  CM to follow  Anna Ely RN, BC  Supervisor [facility]   [phone] (Fax)

## 2018-05-02 ENCOUNTER — OFFICE VISIT (OUTPATIENT)
Dept: FAMILY MEDICINE | Facility: CLINIC | Age: 83
End: 2018-05-02
Payer: COMMERCIAL

## 2018-05-02 VITALS
RESPIRATION RATE: 22 BRPM | TEMPERATURE: 97.5 F | HEART RATE: 80 BPM | HEIGHT: 60 IN | DIASTOLIC BLOOD PRESSURE: 82 MMHG | SYSTOLIC BLOOD PRESSURE: 114 MMHG

## 2018-05-02 DIAGNOSIS — M79.672 FOOT PAIN, BILATERAL: ICD-10-CM

## 2018-05-02 DIAGNOSIS — I48.20 CHRONIC ATRIAL FIBRILLATION (H): Primary | ICD-10-CM

## 2018-05-02 DIAGNOSIS — M79.603 PAIN OF UPPER EXTREMITY, UNSPECIFIED LATERALITY: ICD-10-CM

## 2018-05-02 DIAGNOSIS — I50.9 CONGESTIVE HEART FAILURE, UNSPECIFIED CONGESTIVE HEART FAILURE CHRONICITY, UNSPECIFIED CONGESTIVE HEART FAILURE TYPE: ICD-10-CM

## 2018-05-02 DIAGNOSIS — G89.4 CHRONIC PAIN SYNDROME: ICD-10-CM

## 2018-05-02 DIAGNOSIS — M79.671 FOOT PAIN, BILATERAL: ICD-10-CM

## 2018-05-02 DIAGNOSIS — E11.42 DIABETIC POLYNEUROPATHY ASSOCIATED WITH TYPE 2 DIABETES MELLITUS (H): ICD-10-CM

## 2018-05-02 DIAGNOSIS — J44.9 CHRONIC OBSTRUCTIVE PULMONARY DISEASE, UNSPECIFIED COPD TYPE (H): ICD-10-CM

## 2018-05-02 DIAGNOSIS — I10 HTN, GOAL BELOW 140/90: ICD-10-CM

## 2018-05-02 DIAGNOSIS — M85.80 OSTEOPENIA, UNSPECIFIED LOCATION: ICD-10-CM

## 2018-05-02 DIAGNOSIS — I50.9 CONGESTIVE HEART FAILURE (H): ICD-10-CM

## 2018-05-02 DIAGNOSIS — J44.1 COPD EXACERBATION (H): ICD-10-CM

## 2018-05-02 DIAGNOSIS — E87.6 HYPOKALEMIA: ICD-10-CM

## 2018-05-02 LAB
ANION GAP SERPL CALCULATED.3IONS-SCNC: 4 MMOL/L (ref 3–14)
BUN SERPL-MCNC: 27 MG/DL (ref 7–30)
CALCIUM SERPL-MCNC: 9.8 MG/DL (ref 8.5–10.1)
CHLORIDE SERPL-SCNC: 105 MMOL/L (ref 94–109)
CO2 SERPL-SCNC: 31 MMOL/L (ref 20–32)
CREAT SERPL-MCNC: 0.89 MG/DL (ref 0.52–1.04)
GFR SERPL CREATININE-BSD FRML MDRD: 61 ML/MIN/1.7M2
GLUCOSE SERPL-MCNC: 106 MG/DL (ref 70–99)
HBA1C MFR BLD: 6 % (ref 0–5.6)
POTASSIUM SERPL-SCNC: 4.7 MMOL/L (ref 3.4–5.3)
SODIUM SERPL-SCNC: 140 MMOL/L (ref 133–144)

## 2018-05-02 PROCEDURE — 83036 HEMOGLOBIN GLYCOSYLATED A1C: CPT | Performed by: FAMILY MEDICINE

## 2018-05-02 PROCEDURE — 99214 OFFICE O/P EST MOD 30 MIN: CPT | Performed by: FAMILY MEDICINE

## 2018-05-02 PROCEDURE — 36415 COLL VENOUS BLD VENIPUNCTURE: CPT | Performed by: FAMILY MEDICINE

## 2018-05-02 PROCEDURE — 80048 BASIC METABOLIC PNL TOTAL CA: CPT | Performed by: FAMILY MEDICINE

## 2018-05-02 RX ORDER — FUROSEMIDE 20 MG
20 TABLET ORAL 2 TIMES DAILY
Qty: 180 TABLET | Refills: 1 | Status: SHIPPED | OUTPATIENT
Start: 2018-05-02

## 2018-05-02 RX ORDER — ALBUTEROL SULFATE 90 UG/1
2 AEROSOL, METERED RESPIRATORY (INHALATION) EVERY 6 HOURS PRN
Qty: 1 INHALER | Refills: 6 | Status: SHIPPED | OUTPATIENT
Start: 2018-05-02

## 2018-05-02 RX ORDER — TRAMADOL HYDROCHLORIDE 50 MG/1
TABLET ORAL
Qty: 60 TABLET | Refills: 0 | Status: SHIPPED | OUTPATIENT
Start: 2018-05-02

## 2018-05-02 NOTE — PROGRESS NOTES
SUBJECTIVE:   Nataliia Scanlon is a 82 year old female who presents to clinic today for the following health issues:      Pt states she is here for a 6 month follow up , med check and some refills and is having foot pain with hard time walking.   Both feet hurt with weight bearing.   They have hurt for at least 6-12 months.    It is hard to walk due to the pain.   She is not having any trouble with her meds.   She makes it seem that her feet are not that bad and her sister who is with her says it is really bad and she complains every day and has a hard time getting around.   She has special shoes.       Past Medical History:   Diagnosis Date     Aortic sclerosis 2006    check echo every 2 years - last one 11/2013     Atrial fibrillation (H) 2014    Dr. Lamar and she decided NO coumadin 1/2015     Baker's cyst of knee 4/2008    left - small     CHF (congestive heart failure) 12/3/2014     Chronic airway obstruction, not elsewhere classified 2002    moderately severe     Diabetes type 2, controlled (H) 2011     HTN, goal below 140/90 5/21/2003     Hyperlipidaemia      Maxillary fracture (H) 5-14     Mitral regurgitation     1-2+     NONSPECIFIC MEDICAL HISTORY 2006    pt is DNI but not DNR - see living will      Osteoarthritis 4/2009    and bilateral knee steroid injection     Syncope      Tricuspid regurgitation     2-3+     Unspecified essential hypertension      Venous insufficiency 5/19/2010       Past Surgical History:   Procedure Laterality Date     C NONSPECIFIC PROCEDURE      hosp x 1 for HTN       MEDICATIONS:  Current Outpatient Prescriptions   Medication     albuterol (2.5 MG/3ML) 0.083% nebulizer solution     albuterol (PROAIR HFA/PROVENTIL HFA/VENTOLIN HFA) 108 (90 Base) MCG/ACT Inhaler     ascorbic acid (VITAMIN C) 500 MG tablet     ASPIR-LOW 81 MG EC tablet     atorvastatin (LIPITOR) 20 MG tablet     CALCIUM 500+D HIGH POTENCY 500-400 MG-UNIT TABS per tablet     carvedilol (COREG) 25 MG tablet      COMPOUND (CMPD RX) - PHARMACY TO MIX COMPOUNDED MEDICATION     COMPOUNDED NON-CONTROLLED SUBSTANCE (CMPD RX) - PHARMACY TO MIX COMPOUNDED MEDICATION     donepezil (ARICEPT) 5 MG tablet     Elastic Bandages & Supports (JOBST RELIEF KNEE HIGH/MEDIUM) MISC     fish oil-omega-3 fatty acids 1000 MG capsule     fluticasone (FLONASE) 50 MCG/ACT spray     fluticasone-salmeterol (ADVAIR DISKUS) 250-50 MCG/DOSE diskus inhaler     furosemide (LASIX) 20 MG tablet     hydrALAZINE (APRESOLINE) 25 MG tablet     losartan (COZAAR) 100 MG tablet     Magnesium 400 MG CAPS     Multiple Vitamins-Minerals (OCUVITE PO)     order for DME     order for DME     order for DME     order for DME     potassium chloride SA (K-DUR/KLOR-CON M) 20 MEQ CR tablet     spironolactone (ALDACTONE) 25 MG tablet     traMADol (ULTRAM) 50 MG tablet     ipratropium - albuterol 0.5 mg/2.5 mg/3 mL (DUONEB) 0.5-2.5 (3) MG/3ML nebulization     [DISCONTINUED] ADVAIR DISKUS 250-50 MCG/DOSE diskus inhaler     [DISCONTINUED] albuterol (PROAIR HFA, PROVENTIL HFA, VENTOLIN HFA) 108 (90 BASE) MCG/ACT inhaler     [DISCONTINUED] furosemide (LASIX) 20 MG tablet     [DISCONTINUED] furosemide (LASIX) 20 MG tablet     [DISCONTINUED] spironolactone (ALDACTONE) 25 MG tablet     No current facility-administered medications for this visit.        SOCIAL HISTORY:  Social History   Substance Use Topics     Smoking status: Never Smoker     Smokeless tobacco: Never Used     Alcohol use No       Family History   Problem Relation Age of Onset     Hypertension Mother      Hypertension Sister        Objective:  Blood pressure 114/82, pulse 80, temperature 97.5  F (36.4  C), temperature source Oral, resp. rate 22, height 5' (1.524 m), not currently breastfeeding.  HEENT:  TM's are clear bilaterally.  Oropharynx is clear without tonsillar hypertrophy or exudate.  Conjuctiva are clear.  Neck:  There is no lymphadenopathy or thyroid tenderness or enlargement  Chest: Clear to auscultation  bilaterally.  No wheezes, rales or retractions.  CV:  Irregular irregular - there is normal rate and 2/6 murmur heard today which is is quieter than in past  ABDOMEN:  Positive bowel sounds, soft, nondistended and nontender.  No masses are palpated and there is no organomegaly or inguinal lymphadenopathy.  Ext: no pitting edema and some 1+ soft edema of ankles  Some tenderness of anterior heel and some at 1st MTP joint but patient really unable to say where her feet are hurting - DP pulses are palpable and cap refills is brisk    Assessment:  1. COPD - stable  2. A fib - stable and no tx with coumadin  3. Hx of CHF - good now  4. Hx of hypokalemia  5. Osteopenia - patient does not wish to check on this  6. Chronic pain - stable - could consider gabapentin or lyrica especially if foot pain ends up being all neuropathy which she does have  7. Foot pain - hard to assess - patient is poor historian - could be neuropathy but tender at heel and MTP of 1st joint as well  8. hypertension - under good control    Plan:  1. Refills per epicare  2. Continue meds at current doses  3. Will send to podiatry to see if there is anything else that can be done for her foot pain  4. Will get echo to assess heart function - last one in 2015  5. Recheck in 6 months

## 2018-05-02 NOTE — LETTER
May 7, 2018      Nataliia L Capo  [address] [place] [address]        Dear ,    We are writing to inform you of your test results.    Your test results fall within the expected range(s) or remain unchanged from previous results.  Please continue with current treatment plan.    Resulted Orders   Hemoglobin A1c   Result Value Ref Range    Hemoglobin A1C 6.0 (H) 0 - 5.6 %      Comment:      Normal <5.7% Prediabetes 5.7-6.4%  Diabetes 6.5% or higher - adopted from ADA   consensus guidelines.     Basic metabolic panel   Result Value Ref Range    Sodium 140 133 - 144 mmol/L    Potassium 4.7 3.4 - 5.3 mmol/L    Chloride 105 94 - 109 mmol/L    Carbon Dioxide 31 20 - 32 mmol/L    Anion Gap 4 3 - 14 mmol/L    Glucose 106 (H) 70 - 99 mg/dL    Urea Nitrogen 27 7 - 30 mg/dL    Creatinine 0.89 0.52 - 1.04 mg/dL    GFR Estimate 61 >60 mL/min/1.7m2      Comment:      Non  GFR Calc    GFR Estimate If Black 73 >60 mL/min/1.7m2      Comment:       GFR Calc    Calcium 9.8 8.5 - 10.1 mg/dL                   If you have any questions or concerns, please call the clinic at the number listed above.       Sincerely,        Mari Dunaway MD

## 2018-05-02 NOTE — MR AVS SNAPSHOT
After Visit Summary   5/2/2018    Nataliia Scanlon    MRN: [id number]           Patient Information     Date Of Birth          1935        Visit Information        Provider Department      5/2/2018 8:45 AM Mari Dunaway MD [facility]        Today's Diagnoses     Chronic atrial fibrillation (H)    -  1    Chronic obstructive pulmonary disease, unspecified COPD type (H)        COPD exacerbation (H)        Congestive heart failure, unspecified congestive heart failure chronicity, unspecified congestive heart failure type (H)        Pain of upper extremity, unspecified laterality        Hypokalemia        Osteopenia, unspecified location        Chronic pain syndrome        Diabetic polyneuropathy associated with type 2 diabetes mellitus (H)        Foot pain, bilateral           Follow-ups after your visit        Additional Services     PODIATRY/FOOT & ANKLE SURGERY REFERRAL       Your provider has referred you to: [facility]: [facility] [phone]   [email or web address]    Please be aware that coverage of these services is subject to the terms and limitations of your health insurance plan.  Call member services at your health plan with any benefit or coverage questions.      Please bring the following to your appointment:  >>   Any x-rays, CTs or MRIs which have been performed.  Contact the facility where they were done to arrange for  prior to your scheduled appointment.    >>   List of current medications   >>   This referral request   >>   Any documents/labs given to you for this referral                  Follow-up notes from your care team     Return in about 6 months (around 11/2/2018) for Medication recheck.      Future tests that were ordered for you today     Open Future Orders        Priority Expected Expires Ordered    Echocardiogram Complete Routine  5/2/2019 5/2/2018            Who to contact     If you have  "questions or need follow up information about today's clinic visit or your schedule please contact [facility] directly at [phone].  Normal or non-critical lab and imaging results will be communicated to you by MyChart, letter or phone within 4 business days after the clinic has received the results. If you do not hear from us within 7 days, please contact the clinic through [employer] or phone. If you have a critical or abnormal lab result, we will notify you by phone as soon as possible.  Submit refill requests through [employer] or call your pharmacy and they will forward the refill request to us. Please allow 3 business days for your refill to be completed.          Additional Information About Your Visit        [employer] Information     [employer] lets you send messages to your doctor, view your test results, renew your prescriptions, schedule appointments and more. To sign up, go to [email or web address] . Click on \"Log in\" on the left side of the screen, which will take you to the Welcome page. Then click on \"Sign up Now\" on the right side of the page.     You will be asked to enter the access code listed below, as well as some personal information. Please follow the directions to create your username and password.     Your access code is: [email or web address]  Expires: 2018 10:10 AM     Your access code will  in 90 days. If you need help or a new code, please call your [place] clinic or [phone].        Care EveryWhere ID     This is your Care EveryWhere ID. This could be used by other organizations to access your [place] medical records  [id number]        Your Vitals Were     Pulse Temperature Respirations Height Breastfeeding?       80 97.5  F (36.4  C) (Oral) 22 5' (1.524 m) No        Blood Pressure from Last 3 Encounters:   18 114/82   17 100/82   17 136/83    Weight from Last 3 Encounters:   17 198 lb (89.8 kg)   17 187 lb 11.2 oz (85.1 kg) "   05/09/17 192 lb (87.1 kg)              We Performed the Following     Basic metabolic panel     COPD ACTION PLAN     Drug  Screen Comprehensive, Urine w/o Reported Meds (Pain Care Package)     Hemoglobin A1c     PODIATRY/FOOT & ANKLE SURGERY REFERRAL          Today's Medication Changes          These changes are accurate as of 5/2/18  9:15 AM.  If you have any questions, ask your nurse or doctor.               These medicines have changed or have updated prescriptions.        Dose/Directions    fluticasone-salmeterol 250-50 MCG/DOSE diskus inhaler   Commonly known as:  ADVAIR DISKUS   This may have changed:  See the new instructions.   Used for:  Chronic obstructive pulmonary disease, unspecified COPD type (H)   Changed by:  Mari Dunaway MD        INHALE 1 PUFF INTO THE LUNGS TWICE DAILY   Quantity:  3 Inhaler   Refills:  3       furosemide 20 MG tablet   Commonly known as:  LASIX   This may have changed:  See the new instructions.   Used for:  Congestive heart failure, unspecified congestive heart failure chronicity, unspecified congestive heart failure type (H)   Changed by:  Mari Dunaway MD        Dose:  20 mg   Take 1 tablet (20 mg) by mouth 2 times daily   Quantity:  180 tablet   Refills:  1       traMADol 50 MG tablet   Commonly known as:  ULTRAM   This may have changed:  See the new instructions.   Used for:  Pain of upper extremity, unspecified laterality   Changed by:  Mari Dunaway MD        TAKE 1 TABLET BY MOUTH EVERY 6 HOURS AS NEEDED   Quantity:  60 tablet   Refills:  0            Where to get your medicines      These medications were sent to [facility] [address] [place] [address] [id number] [place] [address]  [id number] [place] [id number]    Hours:  24-hours Phone:  [phone]     albuterol 108 (90 Base) MCG/ACT Inhaler    fluticasone-salmeterol 250-50 MCG/DOSE diskus inhaler    furosemide 20 MG tablet         Some of these will need a paper  prescription and others can be bought over the counter.  Ask your nurse if you have questions.     Bring a paper prescription for each of these medications     traMADol 50 MG tablet               Information about OPIOIDS     PRESCRIPTION OPIOIDS: WHAT YOU NEED TO KNOW   You have a prescription for an opioid (narcotic) pain medicine. Opioids can cause addiction. If you have a history of chemical dependency of any type, you are at a higher risk of becoming addicted to opioids. Only take this medicine after all other options have been tried. Take it for as short a time and as few doses as possible.     Do not:    Drive. If you drive while taking these medicines, you could be arrested for driving under the influence (DUI).    Operate heavy machinery    Do any other dangerous activities while taking these medicines.     Drink any alcohol while taking these medicines.      Take with any other medicines that contain acetaminophen. Read all labels carefully. Look for the word  acetaminophen  or  Tylenol.  Ask your pharmacist if you have questions or are unsure.    Store your pills in a secure place, locked if possible. We will not replace any lost or stolen medicine. If you don t finish your medicine, please throw away (dispose) as directed by your pharmacist. The [place] Pollution Control Agency has more information about safe disposal: https://www.pca.[facility].[place].us/living-green/managing-unwanted-medications    All opioids tend to cause constipation. Drink plenty of water and eat foods that have a lot of fiber, such as fruits, vegetables, prune juice, apple juice and high-fiber cereal. Take a laxative (Miralax, milk of magnesia, Colace, Senna) if you don t move your bowels at least every other day.          Primary Care Provider Office Phone # Fax #    Mari Dunaway [phone] [address] [facility] [address]        Equal Access to Services     EDDA FULTON AH: Tisha Nicole  waarminmadina carter, qaybta kajonathan parson, jeana barrettaatex ah. [facility] [phone].    ATENCIÓN: Si cordell will, tiene a maddox disposición servicios gratuitos de asistencia lingüística. Estuardo al [phone].    We comply with applicable federal civil rights laws and [place] laws. We do not discriminate on the basis of race, color, national origin, age, disability, sex, sexual orientation, or gender identity.            Thank you!     Thank you for choosing [facility]  for your care. Our goal is always to provide you with excellent care. Hearing back from our patients is one way we can continue to improve our services. Please take a few minutes to complete the written survey that you may receive in the mail after your visit with us. Thank you!             Your Updated Medication List - Protect others around you: Learn how to safely use, store and throw away your medicines at www.disposemymeds.org.          This list is accurate as of 5/2/18  9:15 AM.  Always use your most recent med list.                   Brand Name Dispense Instructions for use Diagnosis    * albuterol (2.5 MG/3ML) 0.083% neb solution     30 vial    Take 1 vial (2.5 mg) by nebulization every 6 hours as needed for shortness of breath / dyspnea or wheezing    COPD exacerbation (H)       * albuterol 108 (90 Base) MCG/ACT Inhaler    PROAIR HFA/PROVENTIL HFA/VENTOLIN HFA    1 Inhaler    Inhale 2 puffs into the lungs every 6 hours as needed for shortness of breath / dyspnea or wheezing    COPD exacerbation (H)       ascorbic acid 500 MG tablet    VITAMIN C     Take 500 mg by mouth daily        ASPIR-LOW 81 MG EC tablet   Generic drug:  aspirin     100 tablet    TAKE 1 TABLET BY MOUTH EVERY DAY    CHF (congestive heart failure) (H)       atorvastatin 20 MG tablet    LIPITOR    90 tablet    TAKE 1 TABLET(20 MG) BY MOUTH DAILY    Pure hypercholesterolemia       CALCIUM 500+D HIGH POTENCY 500-400 MG-UNIT Tabs per  tablet   Generic drug:  calcium carbonate-vitamin D     270 tablet    TAKE 1 TABLET BY MOUTH THREE TIMES DAILY    Osteopenia       carvedilol 25 MG tablet    COREG    180 tablet    TAKE 1 TABLET(25 MG) BY MOUTH TWICE DAILY WITH MEALS    Essential hypertension, benign       * COMPOUNDED NON-CONTROLLED SUBSTANCE - PHARMACY TO MIX COMPOUNDED MEDICATION    CMPD RX    120 g    Apply 1-2g to the feet 3-4 times a day as needed for pain.    Pain in both feet, Diabetic polyneuropathy associated with type 2 diabetes mellitus (H), Pes planus of both feet, Venous insufficiency of both lower extremities, Primary localized osteoarthrosis, ankle and foot, right, Primary localized osteoarthrosis, ankle and foot, left       * COMPOUNDED NON-CONTROLLED SUBSTANCE - PHARMACY TO MIX COMPOUNDED MEDICATION    CMPD RX    120 g    Apply 1-2g to the feet 3-4 times a day as needed for pain.    Diabetic polyneuropathy associated with type 2 diabetes mellitus (H), Neuropathic pain of both feet       donepezil 5 MG tablet    ARIcept     Take 1 tablet by mouth daily Reported on 5/9/2017        fish oil-omega-3 fatty acids 1000 MG capsule      Take 1 g by mouth daily        fluticasone 50 MCG/ACT spray    FLONASE    1 Bottle    Spray 1-2 sprays into both nostrils daily    Chronic rhinitis       fluticasone-salmeterol 250-50 MCG/DOSE diskus inhaler    ADVAIR DISKUS    3 Inhaler    INHALE 1 PUFF INTO THE LUNGS TWICE DAILY    Chronic obstructive pulmonary disease, unspecified COPD type (H)       furosemide 20 MG tablet    LASIX    180 tablet    Take 1 tablet (20 mg) by mouth 2 times daily    Congestive heart failure, unspecified congestive heart failure chronicity, unspecified congestive heart failure type (H)       hydrALAZINE 25 MG tablet    APRESOLINE    180 tablet    TAKE 2 TABLETS(50 MG) BY MOUTH DAILY    Essential hypertension, benign       ipratropium - albuterol 0.5 mg/2.5 mg/3 mL 0.5-2.5 (3) MG/3ML neb solution    DUONEB    3 mL    Take 1  vial (3 mLs) by nebulization once for 1 dose    Chest congestion       JOBST RELIEF KNEE HIGH/MEDIUM Misc     1 each    1 each daily    Bilateral edema of lower extremity       losartan 100 MG tablet    COZAAR    90 tablet    TAKE 1 TABLET(100 MG) BY MOUTH DAILY    HTN, goal below 140/90       Magnesium 400 MG Caps      Take 1 tablet by mouth daily        OCUVITE PO      Take 1 capsule by mouth daily        order for DME     1 Units    Equipment being ordered: Nebulizer    COPD exacerbation (H)       * order for DME     1 each    Equipment being ordered: diabetic shoe inserts    Type 2 diabetes mellitus with other circulatory complications       * order for DME     1 each    Equipment being ordered: diabetic shoes    Type 2 diabetes mellitus with other circulatory complications       * order for DME     1 Device    Device to put on compression socks    Pain in both feet, Diabetic polyneuropathy associated with type 2 diabetes mellitus (H), Pes planus of both feet, Venous insufficiency of both lower extremities, Primary localized osteoarthrosis, ankle and foot, right, Primary localized osteoarthrosis, ankle and foot, left       potassium chloride SA 20 MEQ CR tablet    K-DUR/KLOR-CON M    120 tablet    TAKE 2 TABLETS BY MOUTH TWICE DAILY    Hypokalemia       spironolactone 25 MG tablet    ALDACTONE    90 tablet    Take 1 tablet (25 mg) by mouth daily    Hypokalemia       traMADol 50 MG tablet    ULTRAM    60 tablet    TAKE 1 TABLET BY MOUTH EVERY 6 HOURS AS NEEDED    Pain of upper extremity, unspecified laterality       * Notice:  This list has 7 medication(s) that are the same as other medications prescribed for you. Read the directions carefully, and ask your doctor or other care provider to review them with you.

## 2018-05-07 NOTE — PROGRESS NOTES
[facility] Care Coordination Contact  5/2/2018 Rec'd tele call from Nikia to report that she spoke with [facility] to schedule the nursing assessment for next week 5/8/208.  Nikia inquired on payment/rent, etc.  CM provided info on [facility], EW services that would be included in [employer].   CM to follow  Anna Ely RN, BC  Supervisor [facility]   [phone] (Fax)

## 2018-05-08 NOTE — PROGRESS NOTES
[facility] Care Coordination Contact  Rec'd tele call from Ina BURKS at [facility] to report that the assessment has been changed to 5/15/18.  Anna Ely RN, BC  Supervisor [facility]   [phone] (Fax)

## 2018-05-09 ENCOUNTER — PATIENT OUTREACH (OUTPATIENT)
Dept: GERIATRIC MEDICINE | Facility: CLINIC | Age: 83
End: 2018-05-09

## 2018-05-09 NOTE — PROGRESS NOTES
[facility] Care Coordination Contact  Rec'd voice message from Adriana at [facility] requesting a return call    Call placed to Adriana who stated the current auth for ADC does not list the frequency. CM will have an updated auth completed  Adriana also states that clietn came an additional day on 4/17/18, CM will support auth.   Anna Ely RN, BC  Supervisor [facility]   [phone] (Fax)

## 2018-05-16 DIAGNOSIS — E87.6 HYPOKALEMIA: ICD-10-CM

## 2018-05-16 RX ORDER — POTASSIUM CHLORIDE 1500 MG/1
TABLET, EXTENDED RELEASE ORAL
Qty: 120 TABLET | Refills: 3 | Status: SHIPPED | OUTPATIENT
Start: 2018-05-16

## 2018-05-17 ENCOUNTER — PATIENT OUTREACH (OUTPATIENT)
Dept: GERIATRIC MEDICINE | Facility: CLINIC | Age: 83
End: 2018-05-17

## 2018-05-17 ENCOUNTER — TELEPHONE (OUTPATIENT)
Dept: FAMILY MEDICINE | Facility: CLINIC | Age: 83
End: 2018-05-17

## 2018-05-17 NOTE — PROGRESS NOTES
"[facility] Care Coordination Contact  VM left with Taylor at [facility] to f/u on nursing assessment this week and possible admission date.  Call placed to client's niece Nikia who shared that the assessment went well, plan is for client to move to [facility] on 5/29/18.  Early re assessment scheduled for 5/24/18 @ 4 PM.   Nikia states that client does not want to move, stating that it will be a difficult transition, \"but we keep preparing.\"   Anna Ely RN, BC  Supervisor [facility]   [phone] (Fax)    "

## 2018-05-17 NOTE — TELEPHONE ENCOUNTER
Received a 3 page fax from [facility]. Physician Admission Orders. Fax to Dr. Dunawya. Ruth Behrens.

## 2018-05-17 NOTE — LETTER
[facility]  [address] [place], [address]  [phone]        May 21, 2018    Nataliia Scanlon                                                                                                                                                       [address]  [facility] [address]    MEDICATION LIST:    Current Outpatient Prescriptions   Medication Sig Dispense Refill     albuterol (2.5 MG/3ML) 0.083% nebulizer solution Take 1 vial (2.5 mg) by nebulization every 6 hours as needed for shortness of breath / dyspnea or wheezing 30 vial 0     albuterol (PROAIR HFA/PROVENTIL HFA/VENTOLIN HFA) 108 (90 Base) MCG/ACT Inhaler Inhale 2 puffs into the lungs every 6 hours as needed for shortness of breath / dyspnea or wheezing 1 Inhaler 6     ascorbic acid (VITAMIN C) 500 MG tablet Take 500 mg by mouth daily       ASPIR-LOW 81 MG EC tablet TAKE 1 TABLET BY MOUTH EVERY  tablet 1     atorvastatin (LIPITOR) 20 MG tablet TAKE 1 TABLET(20 MG) BY MOUTH DAILY 90 tablet 3     CALCIUM 500+D HIGH POTENCY 500-400 MG-UNIT TABS per tablet TAKE 1 TABLET BY MOUTH THREE TIMES DAILY 270 tablet 1     carvedilol (COREG) 25 MG tablet TAKE 1 TABLET(25 MG) BY MOUTH TWICE DAILY WITH MEALS 180 tablet 3     COMPOUND (CMPD RX) - PHARMACY TO MIX COMPOUNDED MEDICATION Apply 1-2g to the feet 3-4 times a day as needed for pain. 120 g 1     COMPOUNDED NON-CONTROLLED SUBSTANCE (CMPD RX) - PHARMACY TO MIX COMPOUNDED MEDICATION Apply 1-2g to the feet 3-4 times a day as needed for pain. 120 g 1     donepezil (ARICEPT) 5 MG tablet Take 1 tablet by mouth daily Reported on 5/9/2017       Elastic Bandages & Supports (JOBST RELIEF KNEE HIGH/MEDIUM) MISC 1 each daily 1 each 0     fish oil-omega-3 fatty acids 1000 MG capsule Take 1 g by mouth daily       fluticasone (FLONASE) 50 MCG/ACT spray Spray 1-2 sprays into both nostrils daily 1 Bottle 10     fluticasone-salmeterol (ADVAIR DISKUS) 250-50 MCG/DOSE diskus inhaler INHALE 1  PUFF INTO THE LUNGS TWICE DAILY 3 Inhaler 3     furosemide (LASIX) 20 MG tablet Take 1 tablet (20 mg) by mouth 2 times daily 180 tablet 1     hydrALAZINE (APRESOLINE) 25 MG tablet TAKE 2 TABLETS(50 MG) BY MOUTH DAILY 180 tablet 2     ipratropium - albuterol 0.5 mg/2.5 mg/3 mL (DUONEB) 0.5-2.5 (3) MG/3ML nebulization Take 1 vial (3 mLs) by nebulization once for 1 dose 3 mL 0     losartan (COZAAR) 100 MG tablet TAKE 1 TABLET(100 MG) BY MOUTH DAILY 90 tablet 3     Magnesium 400 MG CAPS Take 1 tablet by mouth daily       Multiple Vitamins-Minerals (OCUVITE PO) Take 1 capsule by mouth daily       order for DME Equipment being ordered: Nebulizer 1 Units 0     order for DME Equipment being ordered: diabetic shoe inserts 1 each 0     order for DME Equipment being ordered: diabetic shoes 1 each 0     order for DME Device to put on compression socks 1 Device 0     potassium chloride SA (K-DUR/KLOR-CON M) 20 MEQ CR tablet TAKE 2 TABLETS BY MOUTH TWICE DAILY 120 tablet 3     spironolactone (ALDACTONE) 25 MG tablet Take 1 tablet (25 mg) by mouth daily 90 tablet 3     traMADol (ULTRAM) 50 MG tablet TAKE 1 TABLET BY MOUTH EVERY 6 HOURS AS NEEDED 60 tablet 0

## 2018-05-21 ENCOUNTER — MEDICAL CORRESPONDENCE (OUTPATIENT)
Dept: HEALTH INFORMATION MANAGEMENT | Facility: CLINIC | Age: 83
End: 2018-05-21

## 2018-05-21 ENCOUNTER — PATIENT OUTREACH (OUTPATIENT)
Dept: GERIATRIC MEDICINE | Facility: CLINIC | Age: 83
End: 2018-05-21

## 2018-05-22 NOTE — PROGRESS NOTES
[facility] Care Coordination Contact  5/21/18 Rec'd tele call from client's nichristina Montejo to report that she is working on the admission paperwork for [facility] and she had  questions. Nikia states that she is not able to locate client's HCD, explained that CM will securely  e-mail a copy to Taylor at [facility] and mail a copy to Nikia (Health Care Agent).   CM provided info on [facility] [employer] worker, Mary Zaldivar.  Explained that CM will f/u with Mary to inform of pending move to [facility] and request that she mail the [facility] application to Nikia.  Nikia states that she is the auth rep.  VM left with Mary at [facility], shared client will be moving to [facility] on 5/29/18, request GR[facility] application be mailed to client's auth rep  Explained that CM will complete DHS [id number] once client moves.  Rec'd tele call from Mary, who states that she will mail the GRH application, Mary confirmed that Nikia is the auth rep.  Anna Ely RN, BC  Supervisor [facility]   [phone] (Fax)

## 2018-05-29 ENCOUNTER — PATIENT OUTREACH (OUTPATIENT)
Dept: GERIATRIC MEDICINE | Facility: CLINIC | Age: 83
End: 2018-05-29

## 2018-05-29 NOTE — PROGRESS NOTES
[facility] Care Coordination Contact  Call placed to client's kathraine Montejo to f/u on plans for transition to [facility]. Nikia states that Nataliia moved in this morning, stating that client has been telling everyone that she does not plan to stay.  Explained that CM will f/u with Ina BURKS.   Explained that CM will p/u [facility] unit, Nikia states that she can return to [facility].  Had discussion on ADC, Nikia states that client would like to attend 1 day/wk. Explained that 1 day/wk will fit in the EW budget, will f/u with [facility] POC  Call placed to [facility], spoke with Giana to inform family will return unit to [facility]   Call placed to [facility], spoke with Adriana to report that CM will f/u with client and [facility] if client would like to cont to attend [facility].    DTR completed to d/c [facility].   VM left with Ina BURKS [facility], requesting a return call.  Anna Ely RN, BC  Supervisor [facility]   [phone] (Fax)

## 2018-05-30 ENCOUNTER — PATIENT OUTREACH (OUTPATIENT)
Dept: GERIATRIC MEDICINE | Facility: CLINIC | Age: 83
End: 2018-05-30

## 2018-05-31 ENCOUNTER — PATIENT OUTREACH (OUTPATIENT)
Dept: GERIATRIC MEDICINE | Facility: CLINIC | Age: 83
End: 2018-05-31

## 2018-05-31 NOTE — PROGRESS NOTES
"[facility] Care Coordination Contact  5/30/18 faxed AMO [phone] to Mary Zaldivar at [facility] to report change in address eff 5/29/18 5/31/18 Rec'd tele call from client's katharine Montejo to report that they she delivered the [facility] unit to [facility] on 5/30/2018. Inquired on how Nataliia was transitioning to [facility]. Nikia states that it is not going well, nursing has reported that Nataliia will sit at outside the front door waiting for someone to pick her up. Nikia states that the AL staff recommended that family not visit Nataliia for a few days. Nikia states that initially Nataliia did not want to attend ADC, but then eventually came to enjoy it. Nikia states that family feel that Nataliia is now in a great place and she is not able to return home, \"too stressful.\"   Explained that CM will f/u with Ina RN at [facility]. Explained that Nataliia is due to see Dr. Lamar, Nikia stated that they had an appt but cancelled and thought that Jae would follow cardiology. Explained that Dr. Benitez (Jae) would be the primary provider.  Call placed to Ina BURKS. Ina concurrs with above info, stating that client wants to take the bus and go home. Ina states that when Nataliia was told where the bus stop was located, she stated that she could not walk that far.  Ina states that there was conversation about moving Nataliia to the memory care unit, but she does not think that it would be an appropriate move.  Ina did a cognitive test and Nataliia did well.  Shared that Nataliia had neuro psyche testing completed 9/2016, Ina will call [facility] to obtain results. CM provided Dr. Lynch, neurologist that completed testing and also information on Nataliia's cardiology, Dr. Lamar. Explained that Nataliia is due to see Dr. Lamar. Ina states that Nataliia will have her first appt with Dr. Benitez on 6/5/2018.  Discussed ADC, Ina stated that Nataliia would do well to attend 1 day/wk of ADC.   Ina stated that they " also will obtain orders for their [facility] program, PTA will walk with Nataliia 30 min/3x/wk   Ina states that Nataliia is indep with self cares, AL is providing assistance with meds, orientation/behavior.  Ina recommended that family not visit for a couple more day.   Call placed to Nikia to share above info, upcoming appt with Dr. Benitez, the need to schedule an appt with Dr. Lamar, ADC will decrease to 1 day/wk. Nikia inquired if they could take Nataliia to a graduation party this Saturday, enc'd Nikia to discuss with AL staff.  Anna Ely RN, BC  Supervisor [facility]   [phone] (Fax)

## 2018-05-31 NOTE — PROGRESS NOTES
"[facility] Care Coordination Contact  Dr. Jay,     I am the [facility] care coordinator for Nataliia Scanlon, and I am writing to notify you of a change in services.   Adult Day Care  services have been reduced to 1 day/wk.    This change has occurred because of the addition of Assisted Living services, only 1 day of ADC per week will fit in the  budget.     I am required by the health plan to notify you of this change. No action is required on your part. Please do not hesitate to contact me with any questions or concerns. Thank you.    Call placed to client to inquire on how she is doing at [facility]. Client states that she is doing ok, states that the people are very nice, states that she is playing Bingo, but that the days get long. Discussed ADC, client states that she would be interested in attending 1 day/wk. Client states that she liked ADC at [facility], \"very much.\"  Explained that CM will contact Adriana at [facility] to request 1 day/wk.   Call placed to [facility], spoke with Gillian to request ADC 1 days/wk. Gillian states that she will contact family to make arrangements for 1 day/wk  Anna Ely RN, BC  Supervisor [facility]   [phone] (Fax)        "

## 2018-06-01 NOTE — PROGRESS NOTES
[facility] Care Coordination Contact  Information rec'd from [employer]  The DTR review team has reviewed your request for the member Nataliia Capo.    Decision: The reviewer agrees with CC recommendation to terminate services.  Item/Service: PERS.  Amount: NA  Start date of termination: 06/10/18.  Anna Ely RN, BC  Supervisor [facility]   [phone] (Fax)

## 2018-06-06 ENCOUNTER — PATIENT OUTREACH (OUTPATIENT)
Dept: GERIATRIC MEDICINE | Facility: CLINIC | Age: 83
End: 2018-06-06

## 2018-06-06 DIAGNOSIS — Z76.89 HEALTH CARE HOME: ICD-10-CM

## 2018-06-11 ENCOUNTER — PATIENT OUTREACH (OUTPATIENT)
Dept: GERIATRIC MEDICINE | Facility: CLINIC | Age: 83
End: 2018-06-11

## 2018-06-11 NOTE — PROGRESS NOTES
[facility] Care Coordination Contact  Mailed copy of CL tool to member, faxed copy to AL facility, uploaded into [email or web address] and submitted authorization to health plan.    Mónica Anaya  Case Management Specialist  [facility]  [phone]

## 2018-06-11 NOTE — PROGRESS NOTES
[facility] Care Coordination Contact  6/7/2018 Call placed to client's katharine Montejo to inquire on ADC. Luci states that Nataliia resumed ADC today, plan is that she will drive Nataliia for the time being.   Shared that the current budget is allowing 1 trip to [facility].  Explained that the AL is providing frequent safety checks, providing reassurance and re- direction, even assisting with ADL's.  Luci states that she is hoping that Nataliia will adjust to [facility] and return back to being indep with all of her ADL's.   6/7/18 VM left with Sayda BURKS at [facility] to review Assisted Living plan of care.   AL POC indicate staff providing assistance with dressing, bathing, toileting, etc.   6/7/18 Rec'd tele call from Sayda. Explained that client's case mix is B, prior to moving to [facility], client was indep with all ADL's.  Sayda states that with the increased anxiety, staff are providing hands on care with ADL's. Sayda states that client will sometimes have 3 layers of clothing on and staff need to walk with her back to her apt and assist her with dressing. Explained that the RS tool is at max [employer].  Explained that CM will update [facility], Question need for a new assessment or wait and see if client will return to baseline with ADL's.   6/8/18 CM spoke with MEGAN Angeles at [facility] who will be assigned to client's case to provide update on info above.  Chantell will f/u with Sayda.    6/11/2018. Chart completed. UTF completed.   Close Encounter.  Anna Ely RN, BC  Supervisor [facility]   [phone] (Fax)

## 2018-06-12 NOTE — PROGRESS NOTES
[facility] Care Coordination Contact  Information rec'd from [facility] re RS tool submission  File Name: [id number]   Client PMI: [id number]   RS Start Date: 5/29/2018   93.31  Anna Ely RN, BC  Supervisor [facility]   [phone] (Fax)

## 2018-06-13 ENCOUNTER — RECORDS - HEALTHEAST (OUTPATIENT)
Dept: LAB | Facility: CLINIC | Age: 83
End: 2018-06-13

## 2018-06-13 LAB
ALBUMIN SERPL-MCNC: 3.1 G/DL (ref 3.5–5)
ALP SERPL-CCNC: 91 U/L (ref 45–120)
ALT SERPL W P-5'-P-CCNC: 18 U/L (ref 0–45)
ANION GAP SERPL CALCULATED.3IONS-SCNC: 7 MMOL/L (ref 5–18)
AST SERPL W P-5'-P-CCNC: 18 U/L (ref 0–40)
BILIRUB SERPL-MCNC: 1.3 MG/DL (ref 0–1)
BUN SERPL-MCNC: 19 MG/DL (ref 8–28)
CALCIUM SERPL-MCNC: 9.7 MG/DL (ref 8.5–10.5)
CHLORIDE BLD-SCNC: 102 MMOL/L (ref 98–107)
CHOLEST SERPL-MCNC: 122 MG/DL
CO2 SERPL-SCNC: 30 MMOL/L (ref 22–31)
CREAT SERPL-MCNC: 0.81 MG/DL (ref 0.6–1.1)
ERYTHROCYTE [DISTWIDTH] IN BLOOD BY AUTOMATED COUNT: 15 % (ref 11–14.5)
FASTING STATUS PATIENT QL REPORTED: ABNORMAL
GFR SERPL CREATININE-BSD FRML MDRD: >60 ML/MIN/1.73M2
GLUCOSE BLD-MCNC: 83 MG/DL (ref 70–125)
HBA1C MFR BLD: 6.2 % (ref 4.2–6.1)
HCT VFR BLD AUTO: 50.1 % (ref 35–47)
HDLC SERPL-MCNC: 42 MG/DL
HGB BLD-MCNC: 16.2 G/DL (ref 12–16)
LDLC SERPL CALC-MCNC: 66 MG/DL
MAGNESIUM SERPL-MCNC: 2.1 MG/DL (ref 1.8–2.6)
MCH RBC QN AUTO: 31.7 PG (ref 27–34)
MCHC RBC AUTO-ENTMCNC: 32.3 G/DL (ref 32–36)
MCV RBC AUTO: 98 FL (ref 80–100)
PLATELET # BLD AUTO: 250 THOU/UL (ref 140–440)
PMV BLD AUTO: 11 FL (ref 8.5–12.5)
POTASSIUM BLD-SCNC: 5 MMOL/L (ref 3.5–5)
PROT SERPL-MCNC: 6.7 G/DL (ref 6–8)
RBC # BLD AUTO: 5.11 MILL/UL (ref 3.8–5.4)
SODIUM SERPL-SCNC: 139 MMOL/L (ref 136–145)
TRIGL SERPL-MCNC: 72 MG/DL
WBC: 5.4 THOU/UL (ref 4–11)

## 2018-12-05 ENCOUNTER — RECORDS - HEALTHEAST (OUTPATIENT)
Dept: LAB | Facility: CLINIC | Age: 83
End: 2018-12-05

## 2018-12-05 LAB
25(OH)D3 SERPL-MCNC: 35.9 NG/ML (ref 30–80)
ALBUMIN SERPL-MCNC: 3.2 G/DL (ref 3.5–5)
ALP SERPL-CCNC: 86 U/L (ref 45–120)
ALT SERPL W P-5'-P-CCNC: 15 U/L (ref 0–45)
ANION GAP SERPL CALCULATED.3IONS-SCNC: 7 MMOL/L (ref 5–18)
AST SERPL W P-5'-P-CCNC: 17 U/L (ref 0–40)
BILIRUB SERPL-MCNC: 1.1 MG/DL (ref 0–1)
BUN SERPL-MCNC: 15 MG/DL (ref 8–28)
CALCIUM SERPL-MCNC: 9.7 MG/DL (ref 8.5–10.5)
CHLORIDE BLD-SCNC: 101 MMOL/L (ref 98–107)
CO2 SERPL-SCNC: 30 MMOL/L (ref 22–31)
CREAT SERPL-MCNC: 0.71 MG/DL (ref 0.6–1.1)
ERYTHROCYTE [DISTWIDTH] IN BLOOD BY AUTOMATED COUNT: 14.9 % (ref 11–14.5)
GFR SERPL CREATININE-BSD FRML MDRD: >60 ML/MIN/1.73M2
GLUCOSE BLD-MCNC: 75 MG/DL (ref 70–125)
HBA1C MFR BLD: 6.1 % (ref 4.2–6.1)
HCT VFR BLD AUTO: 45.6 % (ref 35–47)
HGB BLD-MCNC: 14 G/DL (ref 12–16)
MCH RBC QN AUTO: 31.6 PG (ref 27–34)
MCHC RBC AUTO-ENTMCNC: 30.7 G/DL (ref 32–36)
MCV RBC AUTO: 103 FL (ref 80–100)
PLATELET # BLD AUTO: 213 THOU/UL (ref 140–440)
PMV BLD AUTO: 10.5 FL (ref 8.5–12.5)
POTASSIUM BLD-SCNC: 4.6 MMOL/L (ref 3.5–5)
PROT SERPL-MCNC: 6.9 G/DL (ref 6–8)
RBC # BLD AUTO: 4.43 MILL/UL (ref 3.8–5.4)
SODIUM SERPL-SCNC: 138 MMOL/L (ref 136–145)
WBC: 5.1 THOU/UL (ref 4–11)

## 2018-12-17 ENCOUNTER — TELEPHONE (OUTPATIENT)
Dept: FAMILY MEDICINE | Facility: CLINIC | Age: 83
End: 2018-12-17

## 2018-12-17 NOTE — TELEPHONE ENCOUNTER
Form is done but can you print out patients med list for me to go over and sign and then that can be sent over too?

## 2018-12-17 NOTE — TELEPHONE ENCOUNTER
saadia [facility] Day program calls, [phone], faxed form 12/3/18 for REGINALD to fill out, have to have on file for pt to participate, have not received, will fax form again to [phone] fax number, routed to silver, watch for fax, fill out, give to REGINALD to sign and fax back    Smitha Mancini RN, BSN  Message handled by Nurse Triage.

## 2019-01-01 ENCOUNTER — RECORDS - HEALTHEAST (OUTPATIENT)
Dept: LAB | Facility: CLINIC | Age: 84
End: 2019-01-01

## 2019-01-01 LAB — POTASSIUM BLD-SCNC: 4.7 MMOL/L (ref 3.5–5)

## 2019-01-16 ENCOUNTER — OFFICE VISIT (OUTPATIENT)
Dept: FAMILY MEDICINE | Facility: CLINIC | Age: 84
End: 2019-01-16
Payer: COMMERCIAL

## 2019-01-16 VITALS
BODY MASS INDEX: 39.46 KG/M2 | HEART RATE: 68 BPM | HEIGHT: 60 IN | DIASTOLIC BLOOD PRESSURE: 76 MMHG | SYSTOLIC BLOOD PRESSURE: 137 MMHG | WEIGHT: 201 LBS | TEMPERATURE: 97.2 F | RESPIRATION RATE: 14 BRPM

## 2019-01-16 DIAGNOSIS — E66.01 MORBID OBESITY (H): ICD-10-CM

## 2019-01-16 DIAGNOSIS — J44.9 CHRONIC OBSTRUCTIVE PULMONARY DISEASE, UNSPECIFIED COPD TYPE (H): ICD-10-CM

## 2019-01-16 DIAGNOSIS — E11.59 TYPE 2 DIABETES MELLITUS WITH OTHER CIRCULATORY COMPLICATIONS (H): ICD-10-CM

## 2019-01-16 DIAGNOSIS — H02.539 LID LAG: ICD-10-CM

## 2019-01-16 DIAGNOSIS — I10 HTN, GOAL BELOW 140/90: ICD-10-CM

## 2019-01-16 DIAGNOSIS — I48.20 CHRONIC ATRIAL FIBRILLATION (H): ICD-10-CM

## 2019-01-16 DIAGNOSIS — I50.9 CHRONIC CONGESTIVE HEART FAILURE, UNSPECIFIED HEART FAILURE TYPE (H): ICD-10-CM

## 2019-01-16 DIAGNOSIS — Z01.818 PREOP GENERAL PHYSICAL EXAM: Primary | ICD-10-CM

## 2019-01-16 LAB
ANION GAP SERPL CALCULATED.3IONS-SCNC: 4 MMOL/L (ref 3–14)
BUN SERPL-MCNC: 17 MG/DL (ref 7–30)
CALCIUM SERPL-MCNC: 9.2 MG/DL (ref 8.5–10.1)
CHLORIDE SERPL-SCNC: 100 MMOL/L (ref 94–109)
CO2 SERPL-SCNC: 30 MMOL/L (ref 20–32)
CREAT SERPL-MCNC: 0.74 MG/DL (ref 0.52–1.04)
GFR SERPL CREATININE-BSD FRML MDRD: 75 ML/MIN/{1.73_M2}
GLUCOSE SERPL-MCNC: 94 MG/DL (ref 70–99)
HBA1C MFR BLD: 5.8 % (ref 0–5.6)
HGB BLD-MCNC: 14.8 G/DL (ref 11.7–15.7)
POTASSIUM SERPL-SCNC: 4.7 MMOL/L (ref 3.4–5.3)
SODIUM SERPL-SCNC: 134 MMOL/L (ref 133–144)

## 2019-01-16 PROCEDURE — 85018 HEMOGLOBIN: CPT | Performed by: PHYSICIAN ASSISTANT

## 2019-01-16 PROCEDURE — 99215 OFFICE O/P EST HI 40 MIN: CPT | Performed by: PHYSICIAN ASSISTANT

## 2019-01-16 PROCEDURE — 36415 COLL VENOUS BLD VENIPUNCTURE: CPT | Performed by: PHYSICIAN ASSISTANT

## 2019-01-16 PROCEDURE — 80048 BASIC METABOLIC PNL TOTAL CA: CPT | Performed by: PHYSICIAN ASSISTANT

## 2019-01-16 PROCEDURE — 83036 HEMOGLOBIN GLYCOSYLATED A1C: CPT | Performed by: PHYSICIAN ASSISTANT

## 2019-01-16 PROCEDURE — 93000 ELECTROCARDIOGRAM COMPLETE: CPT | Performed by: PHYSICIAN ASSISTANT

## 2019-01-16 RX ORDER — MOXIFLOXACIN 5 MG/ML
SOLUTION/ DROPS OPHTHALMIC
Refills: 1 | COMMUNITY
Start: 2019-01-04

## 2019-01-16 RX ORDER — PREDNISOLONE ACETATE 10 MG/ML
SUSPENSION/ DROPS OPHTHALMIC
Refills: 3 | COMMUNITY
Start: 2019-01-07

## 2019-01-16 RX ORDER — CITALOPRAM HYDROBROMIDE 20 MG/1
TABLET ORAL
Refills: 10 | COMMUNITY
Start: 2019-01-09

## 2019-01-16 RX ORDER — ACETAMINOPHEN 500 MG/1
CAPSULE ORAL
Refills: 10 | COMMUNITY
Start: 2018-11-01

## 2019-01-16 RX ORDER — LORAZEPAM 0.5 MG/1
TABLET ORAL
Refills: 1 | COMMUNITY
Start: 2018-06-08

## 2019-01-16 ASSESSMENT — MIFFLIN-ST. JEOR: SCORE: 1288.23

## 2019-01-16 NOTE — PATIENT INSTRUCTIONS
I am also recommending you take your albuterol inhaler right before and after your surgery.   Before Your Surgery      Call your surgeon if there is any change in your health. This includes signs of a cold or flu (such as a sore throat, runny nose, cough, rash or fever).    Do not smoke, drink alcohol or take over the counter medicine (unless your surgeon or primary care doctor tells you to) for the 24 hours before and after surgery.    If you take prescribed drugs: Follow your doctor s orders about which medicines to take and which to stop until after surgery.    Eating and drinking prior to surgery: follow the instructions from your surgeon    Take a shower or bath the night before surgery. Use the soap your surgeon gave you to gently clean your skin. If you do not have soap from your surgeon, use your regular soap. Do not shave or scrub the surgery site.  Wear clean pajamas and have clean sheets on your bed.       [address] [id number] [phone]  Dept: [phone]    PRE-OP EVALUATION:  Today's date: 2019    Nataliia Scanlon (: 1935) presents for pre-operative evaluation assessment as requested by Dr. Alex.  She requires evaluation and anesthesia risk assessment prior to undergoing surgery/procedure for treatment of right eye .    Proposed Surgery/ Procedure: lower eyelid repair  Date of Surgery/ Procedure: 19  Time of Surgery/ Procedure: 10:30 am  Hospital/Surgical Facility: [facility]  Fax number for surgical facility: [phone]  Primary Physician: Mari Galeano  Type of Anesthesia Anticipated: to be determined    Patient has a Health Care Directive or Living Will:  NO    1. NO - Do you have a history of heart attack, stroke, stent, bypass or surgery on an artery in the head, neck, heart or legs?  2. NO - Do you ever have any pain or discomfort in your chest?  3. NO - Do you have a history of  Heart Failure?  4.  YES - Are you troubled by shortness of breath when: walking on the level, up a slight hill or at night?  5. NO - Do you currently have a cold, bronchitis or other respiratory infection?  6. NO - Do you have a cough, shortness of breath or wheezing?  7. YES - Do you sometimes get pains in the calves of your legs when you walk?  8. NO - Do you or anyone in your family have previous history of blood clots?  9. NO - Do you or does anyone in your family have a serious bleeding problem such as prolonged bleeding following surgeries or cuts?  10. NO - Have you ever had problems with anemia or been told to take iron pills?  11. NO - Have you had any abnormal blood loss such as black, tarry or bloody stools, or abnormal vaginal bleeding?  12. NO - Have you ever had a blood transfusion?  13. NO - Have you or any of your relatives ever had problems with anesthesia?  14. NO - Do you have sleep apnea, excessive snoring or daytime drowsiness?  15. NO - Do you have any prosthetic heart valves?  16. NO - Do you have prosthetic joints?  17. NO - Is there any chance that you may be pregnant?      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 The above procedure was deemed the next best step in management.       A-FIB - Patient has a longstanding history of chronic A-fib currently on rate control. Current treatment regimen includes Aspirin for stroke prevention and denies significant symptoms of lightheadedness, palpitations or dyspnea.                                                                                                                                                                             .  CHF - Patient has a longstanding history of moderate-severe CHF. Exacerbating conditions include hypertension, COPD and atrial fibrilation. Currently the patient's condition is same. Current treatment regimen includes Angiotensin 2 receptor blocker, Coreg, ASA, diuretic and  spirolactone. The patient denies chest pain, edema, orthopnea, SOB or recent weight gain. Last Echocardiogram 2015. 1 was ordered in may of 2018. Not yet obtained, EKG 1/16/19.                                                                                                                                                                               .  COPD - Patient has a longstanding history of moderate-severe COPD . Patient has been doing well overall noting NO SYMPTOMS and continues on medication regimen consisting of meds below without adverse reactions or side effects.                                                                                                         .  DIABETES - Patient has a longstanding history of DiabetesType Type II . Patient is being treated with diet and denies significant side effects. Control has been good. Complicating factors include but are not limited to: hypertension, hyperlipidemia and morbid obesity .                                                                                                                            .  HYPERLIPIDEMIA - Patient has a long history of significant Hyperlipidemia requiring medication for treatment with recent good control. Patient reports no problems or side effects with the medication.                                                                                                                                                       .  HYPERTENSION - Patient has longstanding history of HTN , currently denies any symptoms referable to elevated blood pressure. Specifically denies chest pain, palpitations, dyspnea, orthopnea, PND or peripheral edema. Blood pressure readings have been in normal range. Current medication regimen is as listed below. Patient denies any side effects of medication.                                                                                                                                                                                           .    MEDICAL HISTORY:     Patient Active Problem List    Diagnosis Date Noted     Diabetic polyneuropathy associated with type 2 diabetes mellitus (H) 09/25/2017     Priority: Medium     Chronic pain syndrome 05/04/2016     Priority: Medium     Patient is followed by Mari Galeano MD for ongoing prescription of pain medication.  All refills should be approved by this provider, or covering partner.    Medication(s): tramadol.   Maximum quantity per month: 60  Clinic visit frequency required: Q 6 months     Controlled substance agreement on file: No    Pain Clinic evaluation in the past: No    DIRE Total Score(s):  No flowsheet data found.    Last [facility] website verification: 3.1.18   [email or web address]           Osteopenia 01/25/2016     Priority: Medium     Chronic obstructive pulmonary disease, unspecified COPD type (H) 01/25/2016     Priority: Medium     Type 2 diabetes mellitus with other circulatory complications (H) 10/21/2015     Priority: Medium     Atrial fibrillation (H) 10/21/2015     Priority: Medium     Dermatitis 07/29/2015     Priority: Medium     Pain in limb 06/22/2015     Priority: Medium     Mitral regurgitation      Priority: Medium     11/6/2015 - mild to mod (1-2+) per echo       Tricuspid regurgitation      Priority: Medium     11/6/2015 - mod-mod/sev (2-3+) per echo       CHF (congestive heart failure) (H) 12/03/2014     Priority: Medium     Toe infection 09/24/2014     Priority: Medium     DJD (degenerative joint disease) of knee 08/13/2014     Priority: Medium     Edema 07/30/2014     Priority: Medium     Hypokalemia 07/28/2014     Priority: Medium     Maxillary fracture (H)      Priority: Medium     Syncope 05/09/2014     Priority: Medium     Facial fracture due to fall (H) 05/09/2014     Priority: Medium     Troponin level elevated 05/09/2014     Priority: Medium     Bursitis of hip 05/06/2014     Priority: Medium     Vitamin D deficiency 03/05/2014      Priority: Medium     Problem list name updated by automated process. Provider to review       Aortic sclerosis 11/13/2013     Priority: Medium     ACP (advance care planning) 02/22/2012     Priority: Medium     Advance Care Planning 3/21/2017: Receipt of ACP document:  Received: invalid POLST dated 12/7/16.  Document previously scanned on 1/5/17.  Request made to delete invalid medical order document. Code Status reflects choices in most recent valid ACP document (and on the invalid POLST of 12/7/16).  Confirmed/documented designated decision maker(s).  Added by More De   Advance Care Planning Liaison  Advance Care Planning 3/14/2016: ACP Review of Chart:  Reviewed chart for advance care plan.  Nataliia Scanlon has an up to date advance care plan on file.  Added by Nilda Ely  Advance Care Planning  4/16/2013: Receipt of ACP document:  Received: Health Care Directive which was witnessed or notarized on 7-14-06.  Document previously scanned on 7-18-06 but scanned to incorrect document type and encounter-edited to AD/LW doc type.  Validation form completed and sent to be scanned.  Code Status currently not noted on file.  Code status should be amended to reflect current status. Confirmed/documented designated decision maker(s). See permanent comments section of demographics in clinical tab. View document(s) and details by clicking on code status. Added by Megha Cutler RN System ACP Coordinator  Advance Care Planning 2/22/12: - PT declined. jr       Gout 06/15/2011     Priority: Medium     HYPERLIPIDEMIA LDL GOAL <100 10/31/2010     Priority: Medium     Venous insufficiency 05/19/2010     Priority: Medium     Disorder of bone and cartilage 05/02/2006     Priority: Medium     Problem list name updated by automated process. Provider to review       HTN, goal below 140/90 05/21/2003     Priority: Medium     Chronic airway obstruction (H) 05/21/2003     Priority: Medium     Problem list name updated  by automated process. Provider to review        Past Medical History:   Diagnosis Date     Aortic sclerosis 2006    check echo every 2 years - last one 11/2013     Atrial fibrillation (H) 2014    Dr. Lamar and she decided NO coumadin 1/2015     Baker's cyst of knee 4/2008    left - small     CHF (congestive heart failure) 12/3/2014     Chronic airway obstruction, not elsewhere classified 2002    moderately severe     Diabetes type 2, controlled (H) 2011     HTN, goal below 140/90 5/21/2003     Hyperlipidaemia      Maxillary fracture (H) 5-14     Mitral regurgitation     1-2+     NONSPECIFIC MEDICAL HISTORY 2006    pt is DNI but not DNR - see living will      Osteoarthritis 4/2009    and bilateral knee steroid injection     Syncope      Tricuspid regurgitation     2-3+     Unspecified essential hypertension      Venous insufficiency 5/19/2010     Past Surgical History:   Procedure Laterality Date     C NONSPECIFIC PROCEDURE      hosp x 1 for HTN     Current Outpatient Medications   Medication Sig Dispense Refill     albuterol (PROAIR HFA/PROVENTIL HFA/VENTOLIN HFA) 108 (90 Base) MCG/ACT Inhaler Inhale 2 puffs into the lungs every 6 hours as needed for shortness of breath / dyspnea or wheezing 1 Inhaler 6     ascorbic acid (VITAMIN C) 500 MG tablet Take 500 mg by mouth daily       atorvastatin (LIPITOR) 20 MG tablet TAKE 1 TABLET(20 MG) BY MOUTH DAILY 90 tablet 3     CALCIUM 500+D HIGH POTENCY 500-400 MG-UNIT TABS per tablet TAKE 1 TABLET BY MOUTH THREE TIMES DAILY 270 tablet 1     carvedilol (COREG) 25 MG tablet TAKE 1 TABLET(25 MG) BY MOUTH TWICE DAILY WITH MEALS 180 tablet 3     COMPOUND (CMPD RX) - PHARMACY TO MIX COMPOUNDED MEDICATION Apply 1-2g to the feet 3-4 times a day as needed for pain. 120 g 1     COMPOUNDED NON-CONTROLLED SUBSTANCE (CMPD RX) - PHARMACY TO MIX COMPOUNDED MEDICATION Apply 1-2g to the feet 3-4 times a day as needed for pain. 120 g 1     donepezil (ARICEPT) 5 MG tablet Take 1 tablet by mouth  daily Reported on 5/9/2017       Elastic Bandages & Supports (JOBST RELIEF KNEE HIGH/MEDIUM) MISC 1 each daily 1 each 0     fluticasone (FLONASE) 50 MCG/ACT spray Spray 1-2 sprays into both nostrils daily 1 Bottle 10     fluticasone-salmeterol (ADVAIR DISKUS) 250-50 MCG/DOSE diskus inhaler INHALE 1 PUFF INTO THE LUNGS TWICE DAILY 3 Inhaler 3     furosemide (LASIX) 20 MG tablet Take 1 tablet (20 mg) by mouth 2 times daily 180 tablet 1     Hypromellose (ARTIFICIAL TEARS OP)        losartan (COZAAR) 100 MG tablet TAKE 1 TABLET(100 MG) BY MOUTH DAILY 90 tablet 3     Magnesium 400 MG CAPS Take 1 tablet by mouth daily       Multiple Vitamins-Minerals (OCUVITE PO) Take 1 capsule by mouth daily       order for DME Device to put on compression socks 1 Device 0     order for DME Equipment being ordered: diabetic shoe inserts 1 each 0     order for DME Equipment being ordered: diabetic shoes 1 each 0     order for DME Equipment being ordered: Nebulizer 1 Units 0     potassium chloride SA (K-DUR/KLOR-CON M) 20 MEQ CR tablet TAKE 2 TABLETS BY MOUTH TWICE DAILY 120 tablet 3     spironolactone (ALDACTONE) 25 MG tablet Take 1 tablet (25 mg) by mouth daily 90 tablet 3     albuterol (2.5 MG/3ML) 0.083% nebulizer solution Take 1 vial (2.5 mg) by nebulization every 6 hours as needed for shortness of breath / dyspnea or wheezing (Patient not taking: Reported on 1/16/2019) 30 vial 0     ASPIR-LOW 81 MG EC tablet TAKE 1 TABLET BY MOUTH EVERY DAY (Patient not taking: Reported on 1/16/2019) 100 tablet 1     citalopram (CELEXA) 20 MG tablet TAKE 1 TAB BY MOUTH ONCE DAILY FOR DEPRESSION  10     fish oil-omega-3 fatty acids 1000 MG capsule Take 1 g by mouth daily       hydrALAZINE (APRESOLINE) 25 MG tablet TAKE 2 TABLETS(50 MG) BY MOUTH DAILY (Patient not taking: Reported on 1/16/2019) 180 tablet 2     ipratropium - albuterol 0.5 mg/2.5 mg/3 mL (DUONEB) 0.5-2.5 (3) MG/3ML nebulization Take 1 vial (3 mLs) by nebulization once for 1 dose 3 mL 0      LORazepam (ATIVAN) 0.5 MG tablet TAKE 1 2 TAB (0.25MG) BY MOUTH TWICE DAILY AS NEEDED  1     MAPAP 500 MG CAPS TAKE 2 TABS (1,000MG) BY MOUTH ONCE DAILY AND TWICE DAILY AS NEEDED FOR FOOT PAIN  10     moxifloxacin (VIGAMOX) 0.5 % ophthalmic solution   1     prednisoLONE acetate (PRED FORTE) 1 % ophthalmic suspension SHAKE LQ AND INT 1 GTT IN OD BID  3     traMADol (ULTRAM) 50 MG tablet TAKE 1 TABLET BY MOUTH EVERY 6 HOURS AS NEEDED (Patient not taking: Reported on 1/16/2019) 60 tablet 0     OTC products: Aspirin    Allergies   Allergen Reactions     No Known Drug Allergies       Latex Allergy: NO    Social History     Tobacco Use     Smoking status: Never Smoker     Smokeless tobacco: Never Used   Substance Use Topics     Alcohol use: No     Alcohol/week: 0.0 oz     History   Drug Use No       REVIEW OF SYSTEMS:   CONSTITUTIONAL: NEGATIVE for fever, chills, change in weight  INTEGUMENTARY/SKIN: NEGATIVE for worrisome rashes, moles or lesions  EYES: NEGATIVE for vision changes or irritation  ENT/MOUTH: NEGATIVE for ear, mouth and throat problems  RESP: NEGATIVE for significant cough or SOB  BREAST: NEGATIVE for masses, tenderness or discharge  CV: NEGATIVE for chest pain, palpitations or peripheral edema  GI: NEGATIVE for nausea, abdominal pain, heartburn, or change in bowel habits  : NEGATIVE for frequency, dysuria, or hematuria  MUSCULOSKELETAL: NEGATIVE for significant arthralgias or myalgia  NEURO: NEGATIVE for weakness, dizziness or paresthesias  ENDOCRINE: NEGATIVE for temperature intolerance, skin/hair changes  HEME: NEGATIVE for bleeding problems  PSYCHIATRIC: NEGATIVE for changes in mood or affect    EXAM:   /76 (BP Location: Right arm, Patient Position: Chair, Cuff Size: Adult Large)   Pulse 68   Temp 97.2  F (36.2  C) (Oral)   Resp 14   Ht 1.524 m (5')   Wt 91.2 kg (201 lb)   BMI 39.26 kg/m      GENERAL APPEARANCE: alert, active, no distress and obese and frail     EYES: EOMI,  PERRL      HENT: ear canals and TM's normal and nose and mouth without ulcers or lesions     NECK: no adenopathy, no asymmetry, masses, or scars and thyroid normal to palpation     RESP: no rales or rhonchi. No wheezing. Prolonged expiratory phase.      CV: irregularly regular.      ABDOMEN:  soft, nontender, no HSM or masses and bowel sounds normal     MS: extremities normal- no gross deformities noted, no evidence of inflammation in joints, FROM in all extremities.     SKIN: no suspicious lesions or rashes     NEURO: Normal strength and tone, sensory exam grossly normal, mentation intact and speech normal     PSYCH: mentation appears normal. and affect normal/bright     LYMPHATICS: No cervical adenopathy    DIAGNOSTICS:     EKG: atrial fibrillation, rate 66, LAD. No acute ST or T wave changes noted. Stable compared to previous read.     Labs Resulted Today:   Results for orders placed or performed in visit on 05/02/18   Hemoglobin A1c   Result Value Ref Range    Hemoglobin A1C 6.0 (H) 0 - 5.6 %   Basic metabolic panel   Result Value Ref Range    Sodium 140 133 - 144 mmol/L    Potassium 4.7 3.4 - 5.3 mmol/L    Chloride 105 94 - 109 mmol/L    Carbon Dioxide 31 20 - 32 mmol/L    Anion Gap 4 3 - 14 mmol/L    Glucose 106 (H) 70 - 99 mg/dL    Urea Nitrogen 27 7 - 30 mg/dL    Creatinine 0.89 0.52 - 1.04 mg/dL    GFR Estimate 61 >60 mL/min/1.7m2    GFR Estimate If Black 73 >60 mL/min/1.7m2    Calcium 9.8 8.5 - 10.1 mg/dL       Recent Labs   Lab Test 05/02/18  0922 09/25/17  1058  12/07/16  0929  05/09/14  2214  05/22/13  0910   HGB  --  16.5*  --  16.1*   < >  --    < > 15.3   PLT  --  254  --  218   < >  --    < > 304   INR  --   --   --   --   --  0.93  --  0.96    136   < >  --    < >  --    < > 140   POTASSIUM 4.7 5.1   < >  --    < >  --    < > 3.5   CR 0.89 0.89   < >  --    < >  --    < > 0.42*   A1C 6.0* 5.6   < > 5.8   < >  --    < > 6.1*    < > = values in this interval not displayed.        IMPRESSION:   Reason for  surgery/procedure: lid lag, right eye  Diagnosis/reason for consult: preoperative exam for the above procedure.     The proposed surgical procedure is considered LOW risk.    REVISED CARDIAC RISK INDEX  The patient has the following serious cardiovascular risks for perioperative complications such as (MI, PE, VFib and 3  AV Block):  Coronary Artery Disease (MI, positive stress test, angina, Qs on EKG)  Congestive Heart Failure (pulmonary edema, PND, s3 wayne, CXR with pulmonary congestion, basilar rales)  Diabetes Mellitus (on Insulin)  INTERPRETATION: 3 risks: Class IV (high risk - >11% complication rate)    The patient has the following additional risks for perioperative complications:  No identified additional risks  Morbid obesity      ICD-10-CM    1. Preop general physical exam Z01.818 EKG 12-lead complete w/read - Clinics   2. Lid lag H02.539    3. HTN, goal below 140/90 I10 Basic metabolic panel   4. Chronic obstructive pulmonary disease, unspecified COPD type (H) J44.9    5. Chronic congestive heart failure, unspecified heart failure type (H) I50.9 Basic metabolic panel   6. Type 2 diabetes mellitus with other circulatory complications (H) E11.59 Hemoglobin A1c     Basic metabolic panel   7. Chronic atrial fibrillation (H) I48.2 Basic metabolic panel   8. Morbid obesity (H) E66.01        RECOMMENDATIONS:     NPO after midnight. Albuterol pre operative and post operative recommended.     --Patient is to take all scheduled medications on the day of surgery EXCEPT for modifications listed below.    -all meds to be continued as prescribed.     APPROVAL GIVEN to proceed with proposed procedure, without further diagnostic evaluation       Signed Electronically by: Tre Vicente PA-C    Copy of this evaluation report is provided to requesting physician.    [facility] Preop Guidelines    Revised Cardiac Risk Index

## 2019-01-16 NOTE — PROGRESS NOTES
[facility]  [id number] [facility] [address]  [phone]  Dept: [phone]    PRE-OP EVALUATION:  Today's date: 2019    Nataliia Scanlon (: 1935) presents for pre-operative evaluation assessment as requested by Dr. Alex.  She requires evaluation and anesthesia risk assessment prior to undergoing surgery/procedure for treatment of right eye .    Proposed Surgery/ Procedure: lower eyelid repair  Date of Surgery/ Procedure: 19  Time of Surgery/ Procedure: 10:30 am  Hospital/Surgical Facility: [facility]  Fax number for surgical facility: [phone]  Primary Physician: Mari Galeano  Type of Anesthesia Anticipated: to be determined    Patient has a Health Care Directive or Living Will:  NO    1. NO - Do you have a history of heart attack, stroke, stent, bypass or surgery on an artery in the head, neck, heart or legs?  2. NO - Do you ever have any pain or discomfort in your chest?  3. NO - Do you have a history of  Heart Failure?  4. YES - Are you troubled by shortness of breath when: walking on the level, up a slight hill or at night?  5. NO - Do you currently have a cold, bronchitis or other respiratory infection?  6. NO - Do you have a cough, shortness of breath or wheezing?  7. YES - Do you sometimes get pains in the calves of your legs when you walk?  8. NO - Do you or anyone in your family have previous history of blood clots?  9. NO - Do you or does anyone in your family have a serious bleeding problem such as prolonged bleeding following surgeries or cuts?  10. NO - Have you ever had problems with anemia or been told to take iron pills?  11. NO - Have you had any abnormal blood loss such as black, tarry or bloody stools, or abnormal vaginal bleeding?  12. NO - Have you ever had a blood transfusion?  13. NO - Have you or any of your relatives ever had problems with anesthesia?  14. NO - Do you have sleep apnea, excessive snoring or  daytime drowsiness?  15. NO - Do you have any prosthetic heart valves?  16. NO - Do you have prosthetic joints?  17. NO - Is there any chance that you may be pregnant?      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 The above procedure was deemed the next best step in management.       A-FIB - Patient has a longstanding history of chronic A-fib currently on rate control. Current treatment regimen includes Aspirin for stroke prevention and denies significant symptoms of lightheadedness, palpitations or dyspnea. No anticoagulation used per cardiology due to high risk of falls. Stable for years.                                                                                                                                                                             .  CHF - Patient has a longstanding history of moderate-severe CHF. Exacerbating conditions include hypertension, COPD and atrial fibrilation. Currently the patient's condition is same. Current treatment regimen includes Angiotensin 2 receptor blocker, Coreg, ASA, diuretic and spirolactone. The patient denies chest pain, edema, orthopnea, SOB or recent weight gain. Last Echocardiogram 2015. 1 was ordered in may of 2018. Not yet obtained, EKG 1/16/19.                                                                                                                                                                               .  COPD - Patient has a longstanding history of moderate-severe COPD . Patient has been doing well overall noting NO SYMPTOMS and continues on medication regimen consisting of meds below without adverse reactions or side effects.                                                                                                         .  DIABETES - Patient has a longstanding history of DiabetesType Type II . Patient is being treated with diet and denies significant side effects. Control has been  good. Complicating factors include but are not limited to: hypertension, hyperlipidemia and morbid obesity .                                                                                                                            .  HYPERLIPIDEMIA - Patient has a long history of significant Hyperlipidemia requiring medication for treatment with recent good control. Patient reports no problems or side effects with the medication.                                                                                                                                                       .  HYPERTENSION - Patient has longstanding history of HTN , currently denies any symptoms referable to elevated blood pressure. Specifically denies chest pain, palpitations, dyspnea, orthopnea, PND or peripheral edema. Blood pressure readings have been in normal range. Current medication regimen is as listed below. Patient denies any side effects of medication.                                                                                                                                                                                          .    MEDICAL HISTORY:     Patient Active Problem List    Diagnosis Date Noted     Obesity (BMI 35.0-39.9) with comorbidity (H) 01/16/2019     Priority: Medium     Diabetic polyneuropathy associated with type 2 diabetes mellitus (H) 09/25/2017     Priority: Medium     Chronic pain syndrome 05/04/2016     Priority: Medium     Patient is followed by Mari Galeano MD for ongoing prescription of pain medication.  All refills should be approved by this provider, or covering partner.    Medication(s): tramadol.   Maximum quantity per month: 60  Clinic visit frequency required: Q 6 months     Controlled substance agreement on file: No    Pain Clinic evaluation in the past: No    DIRE Total Score(s):  No flowsheet data found.    Last [facility] website verification: 3.1.18   [email or web address]            Osteopenia 01/25/2016     Priority: Medium     Chronic obstructive pulmonary disease, unspecified COPD type (H) 01/25/2016     Priority: Medium     Type 2 diabetes mellitus with other circulatory complications (H) 10/21/2015     Priority: Medium     Atrial fibrillation (H) 10/21/2015     Priority: Medium     Dermatitis 07/29/2015     Priority: Medium     Pain in limb 06/22/2015     Priority: Medium     Mitral regurgitation      Priority: Medium     11/6/2015 - mild to mod (1-2+) per echo       Tricuspid regurgitation      Priority: Medium     11/6/2015 - mod-mod/sev (2-3+) per echo       CHF (congestive heart failure) (H) 12/03/2014     Priority: Medium     Toe infection 09/24/2014     Priority: Medium     DJD (degenerative joint disease) of knee 08/13/2014     Priority: Medium     Edema 07/30/2014     Priority: Medium     Hypokalemia 07/28/2014     Priority: Medium     Maxillary fracture (H)      Priority: Medium     Syncope 05/09/2014     Priority: Medium     Facial fracture due to fall (H) 05/09/2014     Priority: Medium     Troponin level elevated 05/09/2014     Priority: Medium     Bursitis of hip 05/06/2014     Priority: Medium     Vitamin D deficiency 03/05/2014     Priority: Medium     Problem list name updated by automated process. Provider to review       Aortic sclerosis 11/13/2013     Priority: Medium     ACP (advance care planning) 02/22/2012     Priority: Medium     Advance Care Planning 3/21/2017: Receipt of ACP document:  Received: invalid POLST dated 12/7/16.  Document previously scanned on 1/5/17.  Request made to delete invalid medical order document. Code Status reflects choices in most recent valid ACP document (and on the invalid POLST of 12/7/16).  Confirmed/documented designated decision maker(s).  Added by More De   Advance Care Planning Liaison  Advance Care Planning 3/14/2016: ACP Review of Chart:  Reviewed chart for advance care plan.  Nataliia Scanlon has an up to date  advance care plan on file.  Added by Nilda Ely  Advance Care Planning  4/16/2013: Receipt of ACP document:  Received: Health Care Directive which was witnessed or notarized on 7-14-06.  Document previously scanned on 7-18-06 but scanned to incorrect document type and encounter-edited to AD/LW doc type.  Validation form completed and sent to be scanned.  Code Status currently not noted on file.  Code status should be amended to reflect current status. Confirmed/documented designated decision maker(s). See permanent comments section of demographics in clinical tab. View document(s) and details by clicking on code status. Added by Megha Cutler RN System ACP Coordinator  Advance Care Planning 2/22/12: - PT declined. jr       Gout 06/15/2011     Priority: Medium     HYPERLIPIDEMIA LDL GOAL <100 10/31/2010     Priority: Medium     Venous insufficiency 05/19/2010     Priority: Medium     Disorder of bone and cartilage 05/02/2006     Priority: Medium     Problem list name updated by automated process. Provider to review       HTN, goal below 140/90 05/21/2003     Priority: Medium     Chronic airway obstruction (H) 05/21/2003     Priority: Medium     Problem list name updated by automated process. Provider to review        Past Medical History:   Diagnosis Date     Aortic sclerosis 2006    check echo every 2 years - last one 11/2013     Atrial fibrillation (H) 2014    Dr. Lamar and she decided NO coumadin 1/2015     Baker's cyst of knee 4/2008    left - small     CHF (congestive heart failure) 12/3/2014     Chronic airway obstruction, not elsewhere classified 2002    moderately severe     Diabetes type 2, controlled (H) 2011     HTN, goal below 140/90 5/21/2003     Hyperlipidaemia      Maxillary fracture (H) 5-14     Mitral regurgitation     1-2+     NONSPECIFIC MEDICAL HISTORY 2006    pt is DNI but not DNR - see living will      Osteoarthritis 4/2009    and bilateral knee steroid injection     Syncope      Tricuspid  regurgitation     2-3+     Unspecified essential hypertension      Venous insufficiency 5/19/2010     Past Surgical History:   Procedure Laterality Date     C NONSPECIFIC PROCEDURE      hosp x 1 for HTN     Current Outpatient Medications   Medication Sig Dispense Refill     albuterol (PROAIR HFA/PROVENTIL HFA/VENTOLIN HFA) 108 (90 Base) MCG/ACT Inhaler Inhale 2 puffs into the lungs every 6 hours as needed for shortness of breath / dyspnea or wheezing 1 Inhaler 6     ascorbic acid (VITAMIN C) 500 MG tablet Take 500 mg by mouth daily       atorvastatin (LIPITOR) 20 MG tablet TAKE 1 TABLET(20 MG) BY MOUTH DAILY 90 tablet 3     CALCIUM 500+D HIGH POTENCY 500-400 MG-UNIT TABS per tablet TAKE 1 TABLET BY MOUTH THREE TIMES DAILY 270 tablet 1     carvedilol (COREG) 25 MG tablet TAKE 1 TABLET(25 MG) BY MOUTH TWICE DAILY WITH MEALS 180 tablet 3     COMPOUND (CMPD RX) - PHARMACY TO MIX COMPOUNDED MEDICATION Apply 1-2g to the feet 3-4 times a day as needed for pain. 120 g 1     COMPOUNDED NON-CONTROLLED SUBSTANCE (CMPD RX) - PHARMACY TO MIX COMPOUNDED MEDICATION Apply 1-2g to the feet 3-4 times a day as needed for pain. 120 g 1     donepezil (ARICEPT) 5 MG tablet Take 1 tablet by mouth daily Reported on 5/9/2017       Elastic Bandages & Supports (JOBST RELIEF KNEE HIGH/MEDIUM) MISC 1 each daily 1 each 0     fluticasone (FLONASE) 50 MCG/ACT spray Spray 1-2 sprays into both nostrils daily 1 Bottle 10     fluticasone-salmeterol (ADVAIR DISKUS) 250-50 MCG/DOSE diskus inhaler INHALE 1 PUFF INTO THE LUNGS TWICE DAILY 3 Inhaler 3     furosemide (LASIX) 20 MG tablet Take 1 tablet (20 mg) by mouth 2 times daily 180 tablet 1     Hypromellose (ARTIFICIAL TEARS OP)        losartan (COZAAR) 100 MG tablet TAKE 1 TABLET(100 MG) BY MOUTH DAILY 90 tablet 3     Magnesium 400 MG CAPS Take 1 tablet by mouth daily       Multiple Vitamins-Minerals (OCUVITE PO) Take 1 capsule by mouth daily       order for DME Device to put on compression socks 1  Device 0     order for DME Equipment being ordered: diabetic shoe inserts 1 each 0     order for DME Equipment being ordered: diabetic shoes 1 each 0     order for DME Equipment being ordered: Nebulizer 1 Units 0     potassium chloride SA (K-DUR/KLOR-CON M) 20 MEQ CR tablet TAKE 2 TABLETS BY MOUTH TWICE DAILY 120 tablet 3     spironolactone (ALDACTONE) 25 MG tablet Take 1 tablet (25 mg) by mouth daily 90 tablet 3     albuterol (2.5 MG/3ML) 0.083% nebulizer solution Take 1 vial (2.5 mg) by nebulization every 6 hours as needed for shortness of breath / dyspnea or wheezing (Patient not taking: Reported on 1/16/2019) 30 vial 0     ASPIR-LOW 81 MG EC tablet TAKE 1 TABLET BY MOUTH EVERY DAY (Patient not taking: Reported on 1/16/2019) 100 tablet 1     citalopram (CELEXA) 20 MG tablet TAKE 1 TAB BY MOUTH ONCE DAILY FOR DEPRESSION  10     fish oil-omega-3 fatty acids 1000 MG capsule Take 1 g by mouth daily       hydrALAZINE (APRESOLINE) 25 MG tablet TAKE 2 TABLETS(50 MG) BY MOUTH DAILY (Patient not taking: Reported on 1/16/2019) 180 tablet 2     ipratropium - albuterol 0.5 mg/2.5 mg/3 mL (DUONEB) 0.5-2.5 (3) MG/3ML nebulization Take 1 vial (3 mLs) by nebulization once for 1 dose 3 mL 0     LORazepam (ATIVAN) 0.5 MG tablet TAKE 1 2 TAB (0.25MG) BY MOUTH TWICE DAILY AS NEEDED  1     MAPAP 500 MG CAPS TAKE 2 TABS (1,000MG) BY MOUTH ONCE DAILY AND TWICE DAILY AS NEEDED FOR FOOT PAIN  10     moxifloxacin (VIGAMOX) 0.5 % ophthalmic solution   1     prednisoLONE acetate (PRED FORTE) 1 % ophthalmic suspension SHAKE LQ AND INT 1 GTT IN OD BID  3     traMADol (ULTRAM) 50 MG tablet TAKE 1 TABLET BY MOUTH EVERY 6 HOURS AS NEEDED (Patient not taking: Reported on 1/16/2019) 60 tablet 0     OTC products: Aspirin    Allergies   Allergen Reactions     No Known Drug Allergies       Latex Allergy: NO    Social History     Tobacco Use     Smoking status: Never Smoker     Smokeless tobacco: Never Used   Substance Use Topics     Alcohol use: No      Alcohol/week: 0.0 oz     History   Drug Use No       REVIEW OF SYSTEMS:   CONSTITUTIONAL: NEGATIVE for fever, chills, change in weight  INTEGUMENTARY/SKIN: NEGATIVE for worrisome rashes, moles or lesions  EYES: NEGATIVE for vision changes or irritation  ENT/MOUTH: NEGATIVE for ear, mouth and throat problems  RESP: NEGATIVE for significant cough or SOB  BREAST: NEGATIVE for masses, tenderness or discharge  CV: NEGATIVE for chest pain, palpitations or peripheral edema  GI: NEGATIVE for nausea, abdominal pain, heartburn, or change in bowel habits  : NEGATIVE for frequency, dysuria, or hematuria  MUSCULOSKELETAL: NEGATIVE for significant arthralgias or myalgia  NEURO: NEGATIVE for weakness, dizziness or paresthesias  ENDOCRINE: NEGATIVE for temperature intolerance, skin/hair changes  HEME: NEGATIVE for bleeding problems  PSYCHIATRIC: NEGATIVE for changes in mood or affect    EXAM:   /76 (BP Location: Right arm, Patient Position: Chair, Cuff Size: Adult Large)   Pulse 68   Temp 97.2  F (36.2  C) (Oral)   Resp 14   Ht 1.524 m (5')   Wt 91.2 kg (201 lb)   BMI 39.26 kg/m      GENERAL APPEARANCE: alert, active, no distress and obese and frail     EYES: EOMI,  PERRL     HENT: ear canals and TM's normal and nose and mouth without ulcers or lesions     NECK: no adenopathy, no asymmetry, masses, or scars and thyroid normal to palpation     RESP: no rales or rhonchi. No wheezing. Prolonged expiratory phase.      CV: irregularly regular.      ABDOMEN:  soft, nontender, no HSM or masses and bowel sounds normal     MS: extremities normal- no gross deformities noted, no evidence of inflammation in joints, FROM in all extremities.     SKIN: no suspicious lesions or rashes     NEURO: Normal strength and tone, sensory exam grossly normal, mentation intact and speech normal     PSYCH: mentation appears normal. and affect normal/bright     LYMPHATICS: No cervical adenopathy    DIAGNOSTICS:     EKG: atrial fibrillation,  rate 66, LAD. No acute ST or T wave changes noted. Stable compared to previous read.     Labs Resulted Today:   Results for orders placed or performed in visit on 01/16/19   Hemoglobin A1c   Result Value Ref Range    Hemoglobin A1C 5.8 (H) 0 - 5.6 %   Basic metabolic panel   Result Value Ref Range    Sodium 134 133 - 144 mmol/L    Potassium 4.7 3.4 - 5.3 mmol/L    Chloride 100 94 - 109 mmol/L    Carbon Dioxide 30 20 - 32 mmol/L    Anion Gap 4 3 - 14 mmol/L    Glucose 94 70 - 99 mg/dL    Urea Nitrogen 17 7 - 30 mg/dL    Creatinine 0.74 0.52 - 1.04 mg/dL    GFR Estimate 75 >60 mL/min/[1.73_m2]    GFR Estimate If Black 87 >60 mL/min/[1.73_m2]    Calcium 9.2 8.5 - 10.1 mg/dL   Hemoglobin   Result Value Ref Range    Hemoglobin 14.8 11.7 - 15.7 g/dL       Recent Labs   Lab Test 05/02/18  0922 09/25/17  1058  12/07/16  0929  05/09/14  2214  05/22/13  0910   HGB  --  16.5*  --  16.1*   < >  --    < > 15.3   PLT  --  254  --  218   < >  --    < > 304   INR  --   --   --   --   --  0.93  --  0.96    136   < >  --    < >  --    < > 140   POTASSIUM 4.7 5.1   < >  --    < >  --    < > 3.5   CR 0.89 0.89   < >  --    < >  --    < > 0.42*   A1C 6.0* 5.6   < > 5.8   < >  --    < > 6.1*    < > = values in this interval not displayed.        IMPRESSION:   Reason for surgery/procedure: lid lag, right eye  Diagnosis/reason for consult: preoperative exam for the above procedure.     The proposed surgical procedure is considered LOW risk.    REVISED CARDIAC RISK INDEX  The patient has the following serious cardiovascular risks for perioperative complications such as (MI, PE, VFib and 3  AV Block):  Coronary Artery Disease (MI, positive stress test, angina, Qs on EKG)  Congestive Heart Failure (pulmonary edema, PND, s3 wayne, CXR with pulmonary congestion, basilar rales)  Diabetes Mellitus (on Insulin)  INTERPRETATION: 3 risks: Class IV (high risk - >11% complication rate)    The patient has the following additional risks for  perioperative complications:  No identified additional risks  Morbid obesity      ICD-10-CM    1. Preop general physical exam Z01.818 EKG 12-lead complete w/read - Clinics     Hemoglobin   2. Lid lag H02.539    3. HTN, goal below 140/90 I10 Basic metabolic panel   4. Chronic obstructive pulmonary disease, unspecified COPD type (H) J44.9    5. Chronic congestive heart failure, unspecified heart failure type (H) I50.9 Basic metabolic panel   6. Type 2 diabetes mellitus with other circulatory complications (H) E11.59 Hemoglobin A1c     Basic metabolic panel   7. Chronic atrial fibrillation (H) I48.2 Basic metabolic panel   8. Morbid obesity (H) E66.01        RECOMMENDATIONS:     NPO after midnight. Albuterol pre operative and post operative recommended.     --Patient is to take all scheduled medications on the day of surgery EXCEPT for modifications listed below.    -all meds to be continued as prescribed.     APPROVAL GIVEN to proceed with proposed procedure, without further diagnostic evaluation       Signed Electronically by: Tre Vicente PA-C    Copy of this evaluation report is provided to requesting physician.    [facility] Preop Guidelines    Revised Cardiac Risk Index

## 2019-03-11 NOTE — TELEPHONE ENCOUNTER
Carvedilol       Last Written Prescription Date: 2/15/16  Last Fill Quantity: 180, # refills: 2    Last Office Visit with [facility], [facility] or [facility] prescribing provider:  12/15/16   Future Office Visit:    Next 5 appointments (look out 90 days)     Mar 08, 2017  9:00 AM   SHORT with Mari Dunaway MD   [facility]    [address] [place] [id number] [phone]                    BP Readings from Last 3 Encounters:   12/15/16 138/72   12/07/16 112/68   10/10/16 120/70         
Prescription approved per [facility] Refill Protocol.  Roberto Alvares RN, BSN    
Alert and oriented to person, place and time

## 2019-04-05 DIAGNOSIS — M85.80 OSTEOPENIA, UNSPECIFIED LOCATION: ICD-10-CM

## 2019-04-05 NOTE — TELEPHONE ENCOUNTER
Kaitlin Baldwin requests hard copy   Pending Prescriptions:                       Disp   Refills    calcium carbonate-vitamin D (CALCIUM 500+*270 ta*1            Sig: TAKE 1 TABLET BY MOUTH THREE TIMES DAILY    So that they may dispense pills to patient's residence, [facility] in .    Just in case we put the request in Dr. Galeano's folder at [facility] - for reference if needed.   Nirmal Gamboa RN

## 2019-05-30 ENCOUNTER — RECORDS - HEALTHEAST (OUTPATIENT)
Dept: LAB | Facility: CLINIC | Age: 84
End: 2019-05-30

## 2019-05-31 LAB
ALBUMIN SERPL-MCNC: 3.1 G/DL (ref 3.5–5)
ALP SERPL-CCNC: 97 U/L (ref 45–120)
ALT SERPL W P-5'-P-CCNC: 11 U/L (ref 0–45)
ANION GAP SERPL CALCULATED.3IONS-SCNC: 6 MMOL/L (ref 5–18)
AST SERPL W P-5'-P-CCNC: 15 U/L (ref 0–40)
BASOPHILS # BLD AUTO: 0 THOU/UL (ref 0–0.2)
BASOPHILS NFR BLD AUTO: 1 % (ref 0–2)
BILIRUB SERPL-MCNC: 0.8 MG/DL (ref 0–1)
BUN SERPL-MCNC: 14 MG/DL (ref 8–28)
CALCIUM SERPL-MCNC: 9.6 MG/DL (ref 8.5–10.5)
CHLORIDE BLD-SCNC: 101 MMOL/L (ref 98–107)
CHOLEST SERPL-MCNC: 117 MG/DL
CO2 SERPL-SCNC: 31 MMOL/L (ref 22–31)
CREAT SERPL-MCNC: 0.85 MG/DL (ref 0.6–1.1)
EOSINOPHIL # BLD AUTO: 0.6 THOU/UL (ref 0–0.4)
EOSINOPHIL NFR BLD AUTO: 12 % (ref 0–6)
ERYTHROCYTE [DISTWIDTH] IN BLOOD BY AUTOMATED COUNT: 13.4 % (ref 11–14.5)
FASTING STATUS PATIENT QL REPORTED: ABNORMAL
GFR SERPL CREATININE-BSD FRML MDRD: >60 ML/MIN/1.73M2
GLUCOSE BLD-MCNC: 83 MG/DL (ref 70–125)
HBA1C MFR BLD: 6 % (ref 4.2–6.1)
HCT VFR BLD AUTO: 43.1 % (ref 35–47)
HDLC SERPL-MCNC: 43 MG/DL
HGB BLD-MCNC: 13.7 G/DL (ref 12–16)
LDLC SERPL CALC-MCNC: 60 MG/DL
LYMPHOCYTES # BLD AUTO: 1.1 THOU/UL (ref 0.8–4.4)
LYMPHOCYTES NFR BLD AUTO: 22 % (ref 20–40)
MAGNESIUM SERPL-MCNC: 1.8 MG/DL (ref 1.8–2.6)
MCH RBC QN AUTO: 32.6 PG (ref 27–34)
MCHC RBC AUTO-ENTMCNC: 31.8 G/DL (ref 32–36)
MCV RBC AUTO: 103 FL (ref 80–100)
MONOCYTES # BLD AUTO: 0.6 THOU/UL (ref 0–0.9)
MONOCYTES NFR BLD AUTO: 12 % (ref 2–10)
NEUTROPHILS # BLD AUTO: 2.7 THOU/UL (ref 2–7.7)
NEUTROPHILS NFR BLD AUTO: 54 % (ref 50–70)
PLATELET # BLD AUTO: 218 THOU/UL (ref 140–440)
PMV BLD AUTO: 11.1 FL (ref 8.5–12.5)
POTASSIUM BLD-SCNC: 4.3 MMOL/L (ref 3.5–5)
PROT SERPL-MCNC: 6.6 G/DL (ref 6–8)
RBC # BLD AUTO: 4.2 MILL/UL (ref 3.8–5.4)
SODIUM SERPL-SCNC: 138 MMOL/L (ref 136–145)
TRIGL SERPL-MCNC: 72 MG/DL
WBC: 5.1 THOU/UL (ref 4–11)

## 2019-09-28 ENCOUNTER — RECORDS - HEALTHEAST (OUTPATIENT)
Dept: LAB | Facility: CLINIC | Age: 84
End: 2019-09-28

## 2019-09-28 LAB
ALBUMIN UR-MCNC: NEGATIVE MG/DL
APPEARANCE UR: ABNORMAL
BACTERIA #/AREA URNS HPF: ABNORMAL HPF
BILIRUB UR QL STRIP: NEGATIVE
COLOR UR AUTO: YELLOW
GLUCOSE UR STRIP-MCNC: NEGATIVE MG/DL
HGB UR QL STRIP: NEGATIVE
KETONES UR STRIP-MCNC: NEGATIVE MG/DL
LEUKOCYTE ESTERASE UR QL STRIP: ABNORMAL
NITRATE UR QL: NEGATIVE
PH UR STRIP: 6.5 [PH] (ref 4.5–8)
RBC #/AREA URNS AUTO: ABNORMAL HPF
SP GR UR STRIP: 1.01 (ref 1–1.03)
SQUAMOUS #/AREA URNS AUTO: ABNORMAL LPF
UROBILINOGEN UR STRIP-ACNC: ABNORMAL
WBC #/AREA URNS AUTO: ABNORMAL HPF

## 2019-09-29 LAB — BACTERIA SPEC CULT: NO GROWTH

## 2019-11-27 ENCOUNTER — RECORDS - HEALTHEAST (OUTPATIENT)
Dept: LAB | Facility: CLINIC | Age: 84
End: 2019-11-27

## 2019-11-27 LAB
25(OH)D3 SERPL-MCNC: 24.9 NG/ML (ref 30–80)
ANION GAP SERPL CALCULATED.3IONS-SCNC: 8 MMOL/L (ref 5–18)
BUN SERPL-MCNC: 13 MG/DL (ref 8–28)
CALCIUM SERPL-MCNC: 9.4 MG/DL (ref 8.5–10.5)
CHLORIDE BLD-SCNC: 101 MMOL/L (ref 98–107)
CO2 SERPL-SCNC: 29 MMOL/L (ref 22–31)
CREAT SERPL-MCNC: 0.79 MG/DL (ref 0.6–1.1)
ERYTHROCYTE [DISTWIDTH] IN BLOOD BY AUTOMATED COUNT: 13.6 % (ref 11–14.5)
GFR SERPL CREATININE-BSD FRML MDRD: >60 ML/MIN/1.73M2
GLUCOSE BLD-MCNC: 76 MG/DL (ref 70–125)
HBA1C MFR BLD: 5.7 % (ref 4.2–6.1)
HCT VFR BLD AUTO: 47.1 % (ref 35–47)
HGB BLD-MCNC: 14.6 G/DL (ref 12–16)
MCH RBC QN AUTO: 32 PG (ref 27–34)
MCHC RBC AUTO-ENTMCNC: 31 G/DL (ref 32–36)
MCV RBC AUTO: 103 FL (ref 80–100)
PLATELET # BLD AUTO: 236 THOU/UL (ref 140–440)
PMV BLD AUTO: 11.1 FL (ref 8.5–12.5)
POTASSIUM BLD-SCNC: 5.3 MMOL/L (ref 3.5–5)
RBC # BLD AUTO: 4.56 MILL/UL (ref 3.8–5.4)
SODIUM SERPL-SCNC: 138 MMOL/L (ref 136–145)
WBC: 4.4 THOU/UL (ref 4–11)

## 2020-01-01 ENCOUNTER — RECORDS - HEALTHEAST (OUTPATIENT)
Dept: LAB | Facility: CLINIC | Age: 85
End: 2020-01-01

## 2020-01-01 ENCOUNTER — HOSPITAL ENCOUNTER (OUTPATIENT)
Dept: ULTRASOUND IMAGING | Facility: CLINIC | Age: 85
End: 2020-03-11
Attending: SURGERY
Payer: COMMERCIAL

## 2020-01-01 ENCOUNTER — TELEPHONE (OUTPATIENT)
Dept: OTHER | Facility: CLINIC | Age: 85
End: 2020-01-01

## 2020-01-01 ENCOUNTER — OFFICE VISIT (OUTPATIENT)
Dept: OTHER | Facility: CLINIC | Age: 85
End: 2020-01-01
Attending: SURGERY
Payer: COMMERCIAL

## 2020-01-01 VITALS — DIASTOLIC BLOOD PRESSURE: 70 MMHG | HEART RATE: 63 BPM | SYSTOLIC BLOOD PRESSURE: 110 MMHG | OXYGEN SATURATION: 92 %

## 2020-01-01 DIAGNOSIS — I77.1 STENOSIS OF RIGHT SUBCLAVIAN ARTERY (H): Primary | ICD-10-CM

## 2020-01-01 DIAGNOSIS — R09.89 OTHER SPECIFIED SYMPTOMS AND SIGNS INVOLVING THE CIRCULATORY AND RESPIRATORY SYSTEMS: ICD-10-CM

## 2020-01-01 DIAGNOSIS — I96 NECROSIS OF FINGER (H): ICD-10-CM

## 2020-01-01 DIAGNOSIS — I77.1 STENOSIS OF RIGHT SUBCLAVIAN ARTERY (H): ICD-10-CM

## 2020-01-01 LAB
25(OH)D3 SERPL-MCNC: 33.9 NG/ML (ref 30–80)
ALBUMIN UR-MCNC: NEGATIVE MG/DL
ANION GAP SERPL CALCULATED.3IONS-SCNC: 8 MMOL/L (ref 5–18)
APPEARANCE UR: CLEAR
BACTERIA SPEC CULT: NO GROWTH
BILIRUB UR QL STRIP: NEGATIVE
BUN SERPL-MCNC: 16 MG/DL (ref 8–28)
CALCIUM SERPL-MCNC: 9.3 MG/DL (ref 8.5–10.5)
CHLORIDE BLD-SCNC: 102 MMOL/L (ref 98–107)
CO2 SERPL-SCNC: 30 MMOL/L (ref 22–31)
COLOR UR AUTO: NORMAL
CREAT SERPL-MCNC: 0.92 MG/DL (ref 0.6–1.1)
ERYTHROCYTE [DISTWIDTH] IN BLOOD BY AUTOMATED COUNT: 13.6 % (ref 11–14.5)
GFR SERPL CREATININE-BSD FRML MDRD: 58 ML/MIN/1.73M2
GLUCOSE BLD-MCNC: 79 MG/DL (ref 70–125)
GLUCOSE UR STRIP-MCNC: NEGATIVE MG/DL
HBA1C MFR BLD: 5.6 %
HCT VFR BLD AUTO: 48.4 % (ref 35–47)
HGB BLD-MCNC: 14.7 G/DL (ref 12–16)
HGB UR QL STRIP: NEGATIVE
KETONES UR STRIP-MCNC: NEGATIVE MG/DL
LEUKOCYTE ESTERASE UR QL STRIP: NEGATIVE
MCH RBC QN AUTO: 32.2 PG (ref 27–34)
MCHC RBC AUTO-ENTMCNC: 30.4 G/DL (ref 32–36)
MCV RBC AUTO: 106 FL (ref 80–100)
NITRATE UR QL: NEGATIVE
PH UR STRIP: 5.5 [PH] (ref 4.5–8)
PLATELET # BLD AUTO: 249 THOU/UL (ref 140–440)
PMV BLD AUTO: 11.3 FL (ref 8.5–12.5)
POTASSIUM BLD-SCNC: 4.4 MMOL/L (ref 3.5–5)
RBC # BLD AUTO: 4.57 MILL/UL (ref 3.8–5.4)
SODIUM SERPL-SCNC: 140 MMOL/L (ref 136–145)
SP GR UR STRIP: 1.01 (ref 1–1.03)
UROBILINOGEN UR STRIP-ACNC: NORMAL
WBC: 5.6 THOU/UL (ref 4–11)

## 2020-01-01 PROCEDURE — G0463 HOSPITAL OUTPT CLINIC VISIT: HCPCS

## 2020-01-01 PROCEDURE — 93880 EXTRACRANIAL BILAT STUDY: CPT

## 2020-01-01 PROCEDURE — 99203 OFFICE O/P NEW LOW 30 MIN: CPT | Mod: ZP | Performed by: SURGERY

## 2020-03-06 NOTE — TELEPHONE ENCOUNTER
March 6, 2020    Patient is scheduled for New Patient Consult appointment on 3/11/2020 with Dr. Child at [facility].     Offered appointment options for 3/10/2020 however those were declined due to the time of available appointments.     [facility]  [facility]    Office: [phone]  Fax: [phone]

## 2020-03-06 NOTE — TELEPHONE ENCOUNTER
"Received imaging report.  Will have report scanned into computer.    Per report, Right UE arterial US:  Findings:  ...\"The right arm shows decreased subclavian artery velocity at 48cm/s.  There is a normal right arm velocity gradient ranging from 48cm/s down to 86cm/s in the brachial artery.  Biphasic waveform is seen with preservation of flow in the radial and ulnar arteries.\"     Impression:  \"1.  Decreased in flow is seen within the right subclavian artery suggesting a more proximal stenosis.  2.  There is loss of the normal velocity gradient of the right upper extremities suggesting multifocal mild atherosclerotic stenotic disease.\"    Will route to scheduling to coordinate consult with Vascular Surgery ASAP due to nonhealing wound.    Niyah Chavez, RADHAN, RN-[facility]    "

## 2020-03-06 NOTE — TELEPHONE ENCOUNTER
"Referral received via fax on 3/6/20.    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    Per referring notes, \"distal portion of 5th digit of right hand black.  Non-tender, no swelling, no pain, ROM intact.  Unclear when this occurred, it was not mentioned.\"  Referring notes state imaging was completed, however no report was attached.  Contacted SAVANA Buchanan (referral coordinator) at [phone], requesting imaging report.    Niyah Chavez, RADHAN, RN-[facility]      "

## 2020-03-10 NOTE — TELEPHONE ENCOUNTER
Per Dr. Child, he would like pt to obtain bilateral carotid artery US prior to 3/11/20 appointment if possible to assist with determining plan for patient.  Routing to scheduling to coordinate and contact patient.    Niyah Chavez, RADHAN, RN-[facility] [place]

## 2020-03-10 NOTE — TELEPHONE ENCOUNTER
Called and scheduled patient for a 10:00am Ultrasound (03/11/20) prior to her Dr Child Consult Appointment. Giana (patient's niece) will be bringing Nataliia to her appointment.    EKG, CHx, MEds Revaluate.

## 2020-03-11 NOTE — NURSING NOTE
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    Weight as of 1/16/19: 201 lb (91.2 kg).    Pain Score:  Data Unavailable        Kathrin James MA

## 2020-03-11 NOTE — NURSING NOTE
Right fifth finger, photo taken 3/11/20 with patient permission.    Niyah Chavez, RADHAN, RN-[facility] [place]

## 2020-03-16 NOTE — PROGRESS NOTES
[facility] VASCULAR [facility] CENTER INITIAL VASCULAR SURGERY CONSULT    Impression:   1.  Probable upper respiratory infection with audible rhonchi, expiratory wheeze, and productive cough.    2.  Probable ischemic changes of the distal phalanx of the right fifth digit.  Palpable right radial pulse at the wrist.  History of atrial fibrillation for which she is not anticoagulated.  Rule out ulcerated right subclavian stenosis as a potential proximal embolic source.    3.  Severe Alzheimer's disease.    Plan:   I had a lengthy discussion with the patient and her niece reviewing all the above.  The most immediate concern is the probable URI.  She had a chest x-ray performed earlier at her assisted living facility.  I encouraged her niece to follow-up on those results and for her to seek appropriate medical attention for the probable URI.    Relative to the apparent ischemic changes of the distal phalanx of the right fifth digit etiology is unclear.  She has palpable pulses at the right wrist.  Blood pressure in the right arm is actually 12 points higher than the left arm.  Carotid ultrasound today showed no significant carotid stenosis and antegrade flow in the bilateral vertebral arteries.  The right fifth finger is nontender and is without erythema or drainage.  Once she is cleared from the standpoint of her presumed URI we will arrange for a CTA of the aortic arch and great vessels to rule out an ulcerated plaque as a proximal embolic source.      HPI:   Nataliia Scanlon is an 84-year-old female who is a resident in a nursing home due to significant Alzheimer's disease.  Last week she underwent a routine medical exam and was noted to have blackened,  necrotic changes of the distal right fifth digit.  The area was nontender.  Right upper extremity arterial ultrasound showed decreased right subclavian artery velocities at 48 cm/s.  She is referred for vascular surgical consultation.  She is a right-handed  "individual who is an unreliable historian.  I am unable to get any type of chronicity to her symptoms.  She states that the changes have been there \"forever\".  The area certainly appears nontender on exam.  Review of the electronic medical record shows that she has a history of atrial fibrillation.  She had her cardiologist have decided against anticoagulation for that condition.  I am unable to elicit any type of history of right arm claudication.    She arrives here today in a wheelchair.  She is wearing a surgical facemask with audible rhonchi and wheezing.  She is coughing significantly and bringing up phlegm.  This was somewhat of an abbreviated encounter as I feel that she needs to be evaluated for probable URI.    CURRENT MEDICATIONS  albuterol (2.5 MG/3ML) 0.083% nebulizer solution, Take 1 vial (2.5 mg) by nebulization every 6 hours as needed for shortness of breath / dyspnea or wheezing  ASPIR-LOW 81 MG EC tablet, TAKE 1 TABLET BY MOUTH EVERY DAY  carvedilol (COREG) 25 MG tablet, TAKE 1 TABLET(25 MG) BY MOUTH TWICE DAILY WITH MEALS  citalopram (CELEXA) 20 MG tablet, TAKE 1 TAB BY MOUTH ONCE DAILY FOR DEPRESSION  COMPOUND (CMPD RX) - PHARMACY TO MIX COMPOUNDED MEDICATION, Apply 1-2g to the feet 3-4 times a day as needed for pain.  donepezil (ARICEPT) 5 MG tablet, Take 1 tablet by mouth daily Reported on 5/9/2017  Elastic Bandages & Supports (JOBST RELIEF KNEE HIGH/MEDIUM) MISC, 1 each daily  fluticasone (FLONASE) 50 MCG/ACT spray, Spray 1-2 sprays into both nostrils daily  fluticasone-salmeterol (ADVAIR DISKUS) 250-50 MCG/DOSE diskus inhaler, INHALE 1 PUFF INTO THE LUNGS TWICE DAILY  furosemide (LASIX) 20 MG tablet, Take 1 tablet (20 mg) by mouth 2 times daily  Hypromellose (ARTIFICIAL TEARS OP),   losartan (COZAAR) 100 MG tablet, TAKE 1 TABLET(100 MG) BY MOUTH DAILY  MAPAP 500 MG CAPS, TAKE 2 TABS (1,000MG) BY MOUTH ONCE DAILY AND TWICE DAILY AS NEEDED FOR FOOT PAIN  moxifloxacin (VIGAMOX) 0.5 % ophthalmic " solution,   Multiple Vitamins-Minerals (OCUVITE PO), Take 1 capsule by mouth daily  order for DME, Device to put on compression socks  order for DME, Equipment being ordered: diabetic shoe inserts  order for DME, Equipment being ordered: diabetic shoes  order for DME, Equipment being ordered: Nebulizer  potassium chloride SA (K-DUR/KLOR-CON M) 20 MEQ CR tablet, TAKE 2 TABLETS BY MOUTH TWICE DAILY  spironolactone (ALDACTONE) 25 MG tablet, Take 1 tablet (25 mg) by mouth daily  albuterol (PROAIR HFA/PROVENTIL HFA/VENTOLIN HFA) 108 (90 Base) MCG/ACT Inhaler, Inhale 2 puffs into the lungs every 6 hours as needed for shortness of breath / dyspnea or wheezing  ascorbic acid (VITAMIN C) 500 MG tablet, Take 500 mg by mouth daily  atorvastatin (LIPITOR) 20 MG tablet, TAKE 1 TABLET(20 MG) BY MOUTH DAILY  calcium carbonate-vitamin D (CALCIUM 500+D HIGH POTENCY) 500-400 MG-UNIT tablet, TAKE 1 TABLET BY MOUTH THREE TIMES DAILY  COMPOUNDED NON-CONTROLLED SUBSTANCE (CMPD RX) - PHARMACY TO MIX COMPOUNDED MEDICATION, Apply 1-2g to the feet 3-4 times a day as needed for pain.  fish oil-omega-3 fatty acids 1000 MG capsule, Take 1 g by mouth daily  hydrALAZINE (APRESOLINE) 25 MG tablet, TAKE 2 TABLETS(50 MG) BY MOUTH DAILY  ipratropium - albuterol 0.5 mg/2.5 mg/3 mL (DUONEB) 0.5-2.5 (3) MG/3ML nebulization, Take 1 vial (3 mLs) by nebulization once for 1 dose  LORazepam (ATIVAN) 0.5 MG tablet, TAKE 1 2 TAB (0.25MG) BY MOUTH TWICE DAILY AS NEEDED  Magnesium 400 MG CAPS, Take 1 tablet by mouth daily  prednisoLONE acetate (PRED FORTE) 1 % ophthalmic suspension, SHAKE LQ AND INT 1 GTT IN OD BID  traMADol (ULTRAM) 50 MG tablet, TAKE 1 TABLET BY MOUTH EVERY 6 HOURS AS NEEDED    No current facility-administered medications on file prior to visit.         PAST MEDICAL HISTORY  Past Medical History:   Diagnosis Date     Aortic sclerosis 2006    check echo every 2 years - last one 11/2013     Atrial fibrillation (H) 2014    Dr. Lamar and she decided  NO coumadin 1/2015     Baker's cyst of knee 4/2008    left - small     CHF (congestive heart failure) 12/3/2014     Chronic airway obstruction, not elsewhere classified 2002    moderately severe     Diabetes type 2, controlled (H) 2011     HTN, goal below 140/90 5/21/2003     Hyperlipidaemia      Maxillary fracture (H) 5-14     Mitral regurgitation     1-2+     NONSPECIFIC MEDICAL HISTORY 2006    pt is DNI but not DNR - see living will      Osteoarthritis 4/2009    and bilateral knee steroid injection     Syncope      Tricuspid regurgitation     2-3+     Unspecified essential hypertension      Venous insufficiency 5/19/2010         PAST SURGICAL HISTORY:  Past Surgical History:   Procedure Laterality Date     C NONSPECIFIC PROCEDURE      hosp x 1 for HTN       ALLERGIES     Allergies   Allergen Reactions     No Known Drug Allergies        FAMILY HISTORY  Family History   Problem Relation Age of Onset     Hypertension Mother      Hypertension Sister        SOCIAL HISTORY  Social History     Tobacco Use     Smoking status: Never Smoker     Smokeless tobacco: Never Used   Substance Use Topics     Alcohol use: No     Alcohol/week: 0.0 standard drinks     Drug use: No       ROS:   Review of Systems   Unable to perform ROS: dementia         EXAM:  /70 (BP Location: Right arm, Patient Position: Chair, Cuff Size: Adult Large)   Pulse 63   SpO2 92%   Breastfeeding No   Physical Exam  Vitals signs reviewed.   Constitutional:       Comments: She is wearing a surgical facemask.  She has audible rhonchi and expiratory wheezing with a productive cough.   HENT:      Head: Normocephalic and atraumatic.   Eyes:      General: No scleral icterus.     Pupils: Pupils are equal, round, and reactive to light.   Cardiovascular:      Pulses:           Radial pulses are 2+ on the right side and 2+ on the left side.   Skin:     General: Skin is warm and dry.      Comments: Please see photo in today's nursing notes demonstrating  ischemic changes of the distal phalanx of the right fifth digit.   Psychiatric:      Comments: See HPI-dementia         Labs:  LIPID RESULTS:  Lab Results   Component Value Date    CHOL 108 09/25/2017    HDL 52 09/25/2017    LDL 37 09/25/2017    TRIG 94 09/25/2017    CHOLHDLRATIO 2.2 08/01/2015       CBC RESULTS:  Lab Results   Component Value Date    WBC 6.1 09/25/2017    RBC 5.26 (H) 09/25/2017    HGB 14.8 01/16/2019    HCT 50.1 (H) 09/25/2017    MCV 95 09/25/2017    MCH 31.4 09/25/2017    MCHC 32.9 09/25/2017    RDW 14.6 09/25/2017     09/25/2017       BMP RESULTS:  Lab Results   Component Value Date     01/16/2019    POTASSIUM 4.7 01/16/2019    CHLORIDE 100 01/16/2019    CO2 30 01/16/2019    ANIONGAP 4 01/16/2019    GLC 94 01/16/2019    BUN 17 01/16/2019    CR 0.74 01/16/2019    GFRESTIMATED 75 01/16/2019    GFRESTBLACK 87 01/16/2019    GAURANG 9.2 01/16/2019        A1C RESULTS:  Lab Results   Component Value Date    A1C 5.8 (H) 01/16/2019         Imaging:  BILATERAL CAROTID ULTRASOUND  3/11/2020 10:28 AM      HISTORY: History of right subclavian artery stenosis as seen on  outside imaging. Other specified symptoms and signs involving the  circulatory and respiratory systems. Stenosis of right subclavian  artery (H).     COMPARISON: None.     RIGHT CAROTID FINDINGS: There is minimal atherosclerotic plaque at the  carotid bifurcation and proximal internal carotid artery.  Right ICA PSV: 75 cm/sec.  Right ICA EDV: 26 cm/sec.  Right ICA/CCA PSV Ratio: 1.17   These indicate less than 50% diameter stenosis of the right ICA.   Right Vertebral: Antegrade flow.   Right ECA: Antegrade flow.      LEFT CAROTID FINDINGS: There is minimal to mild focal atherosclerotic  calcification at the carotid bifurcation without significant stenosis.  Left ICA PSV: 74 cm/sec.  Left ICA EDV: 25 cm/sec.  Left ICA/CCA PSV Ratio: 1.07   These indicate less than 50% diameter stenosis of the left ICA.   Left Vertebral: Antegrade  flow.   Left ECA: Antegrade flow.      Causes of Decreased Accuracy: None.                                                                       IMPRESSION:   1. Less than 50% diameter stenosis of the right ICA relative to the  distal ICA diameter.  2. Less than 50% diameter stenosis of the left ICA relative to the  distal ICA diameter.     JOSE MIGUEL TONEY MD      Total face-to-face time was 20 minutes, greater than 50% spent providing counseling and education.        Constantine Child MD

## 2020-04-01 NOTE — TELEPHONE ENCOUNTER
Pt's imaging for 4/8/20 has been cancelled and delayed due to COVID-19.  I spoke with Deepthi ([phone]), central manager at pt's assisted living facility.  She reports pt's right fifth digit remains the same, dry, skin intact.  I advised Deepthi we will reach out to patient when we are able to reschedule.    Routing to scheduling to contact pt's family to notify them of the above and cancel CTA head/neck.     Niyah Chavez, BSN, RN-[facility]

## 2020-04-02 NOTE — TELEPHONE ENCOUNTER
Spoke with Nikia patients care giver to let her know that imaging and ov for 4/8/2020 have been cancelled due to covid19 and that the assisted living facility reported that the finger in concern has no changes.     Nikia stated that she understood and will reach out middle of May 2020 to try to get this visit reschedule.       Alina PACHECO

## 2020-06-09 NOTE — PROGRESS NOTES
[facility] Care Coordination Contact  Rec'd the following information from [employer]:  The DTR review team has reviewed your request for the member Nataliia Scanlon.    Decision: The reviewer agrees with CC recommendation to reduce services.  Item/Service: Adult Day Care.  Amount: 1 day per week.  Start date of reduction:  06/14/18.  Anna Ely RN, BC  Supervisor [facility]   [phone] (Fax)    
[facility] Care Coordination Contact  Received a request to submit a DTR for the reduction of Adult Day Care . Documentation completed and faxed to the health plan.  [employer].    Viridiana Chris RN  Utilization   [facility]  [phone]      
Alert and oriented, no focal deficits, no motor or sensory deficits.
Yes - the patient is able to be screened

## 2022-05-10 NOTE — PROGRESS NOTES
I ordered your labs through [facility] [employer] as you have [facility]. You do not need to fast for that. See the endocrinologist for the elevated testosterone and polycystic ovary syndrome. Get the ultrasound of your liver done and you can call [phone] to schedule. [facility] Care Coordination Contact  Received a request to submit a DTR for the termination of [employer]. Documentation completed and faxed to the health plan.  [employer].    Viridiana Chris RN  Utilization   [facility]  [phone]

## 2023-05-22 NOTE — TELEPHONE ENCOUNTER
Dr. Galeano-see below 3/15/17 note.  Last refill 3/15/17 #60.  Your note states will not need every 30 days but every 45 days and has only been 20 days since last refill.  Please advise.  Melissa Henry RN    3/15/17    Plan:  1. Continue meds at current doses  2. Does not need any refills today  3. See [facility] orders for labs  4. Recheck in 6 months  5. Will give 60 tramadol each refill now and will not need every 30 days anymore but more like every 45 days.        Patient is followed by Mari Galeano MD for ongoing prescription of pain medication.  All refills should be approved by this provider, or covering partner.    Medication(s): tramadol.   Maximum quantity per month: 60  Clinic visit frequency required: Q 6 months     Controlled substance agreement on file: No    Pain Clinic evaluation in the past: No    DIRE Total Score(s):  No flowsheet data found.    Last [facility] website verification: 02/24/2017   [email or web address]      
Faxed Tramadol Rx to [facility] [address] [place] [address]. [phone]. Ruth Behrens.   
Printed at [employer].  Will need to be signed.  She did not get the refill last time - I put the 60 in so when we cued it up for the next refill it would pull up quantity of 60.   It says in my note does not need any refills today.     
There are no Wet Read(s) to document.
